# Patient Record
Sex: MALE | Race: WHITE | Employment: OTHER | URBAN - METROPOLITAN AREA
[De-identification: names, ages, dates, MRNs, and addresses within clinical notes are randomized per-mention and may not be internally consistent; named-entity substitution may affect disease eponyms.]

---

## 2017-06-15 ENCOUNTER — HOSPITAL ENCOUNTER (OUTPATIENT)
Age: 42
Setting detail: SPECIMEN
Discharge: HOME | End: 2017-06-15
Payer: MEDICARE

## 2017-06-15 LAB
ABSOLUTE EOS #: 0.2 K/UL (ref 0–0.4)
ABSOLUTE LYMPH #: 1.7 K/UL (ref 1–4.8)
ABSOLUTE MONO #: 0.5 K/UL (ref 0.1–1.2)
ALBUMIN SERPL-MCNC: 4.5 G/DL (ref 3.5–5.2)
ALBUMIN/GLOBULIN RATIO: 2.1 (ref 1–2.5)
ALP BLD-CCNC: 66 U/L (ref 40–129)
ALT SERPL-CCNC: 40 U/L (ref 5–41)
ANION GAP SERPL CALCULATED.3IONS-SCNC: 12 MMOL/L (ref 9–17)
AST SERPL-CCNC: 32 U/L
BASOPHILS # BLD: 1 %
BASOPHILS ABSOLUTE: 0.1 K/UL (ref 0–0.2)
BILIRUB SERPL-MCNC: 0.53 MG/DL (ref 0.3–1.2)
BUN BLDV-MCNC: 12 MG/DL (ref 6–20)
BUN/CREAT BLD: NORMAL (ref 9–20)
CALCIUM SERPL-MCNC: 9.5 MG/DL (ref 8.6–10.4)
CHLORIDE BLD-SCNC: 107 MMOL/L (ref 98–107)
CHOLESTEROL/HDL RATIO: 2.6
CHOLESTEROL: 177 MG/DL
CO2: 23 MMOL/L (ref 20–31)
CREAT SERPL-MCNC: 1.12 MG/DL (ref 0.7–1.2)
DIFFERENTIAL TYPE: NORMAL
EOSINOPHILS RELATIVE PERCENT: 4 %
GFR AFRICAN AMERICAN: >60 ML/MIN
GFR NON-AFRICAN AMERICAN: >60 ML/MIN
GFR SERPL CREATININE-BSD FRML MDRD: NORMAL ML/MIN/{1.73_M2}
GFR SERPL CREATININE-BSD FRML MDRD: NORMAL ML/MIN/{1.73_M2}
GLUCOSE BLD-MCNC: 98 MG/DL (ref 70–99)
HCT VFR BLD CALC: 44.9 % (ref 41–53)
HDLC SERPL-MCNC: 68 MG/DL
HEMOGLOBIN: 15.1 G/DL (ref 13.5–17.5)
LDL CHOLESTEROL: 97 MG/DL (ref 0–130)
LYMPHOCYTES # BLD: 35 %
MCH RBC QN AUTO: 29.6 PG (ref 26–34)
MCHC RBC AUTO-ENTMCNC: 33.5 G/DL (ref 31–37)
MCV RBC AUTO: 88.2 FL (ref 80–100)
MONOCYTES # BLD: 10 %
PDW BLD-RTO: 13.1 % (ref 12.5–15.4)
PLATELET # BLD: 263 K/UL (ref 140–450)
PLATELET ESTIMATE: NORMAL
PMV BLD AUTO: 8.8 FL (ref 6–12)
POTASSIUM SERPL-SCNC: 4.5 MMOL/L (ref 3.7–5.3)
RBC # BLD: 5.09 M/UL (ref 4.5–5.9)
RBC # BLD: NORMAL 10*6/UL
SEG NEUTROPHILS: 50 %
SEGMENTED NEUTROPHILS ABSOLUTE COUNT: 2.5 K/UL (ref 1.8–7.7)
SODIUM BLD-SCNC: 142 MMOL/L (ref 135–144)
TOTAL PROTEIN: 6.6 G/DL (ref 6.4–8.3)
TRIGL SERPL-MCNC: 59 MG/DL
VLDLC SERPL CALC-MCNC: NORMAL MG/DL (ref 1–30)
WBC # BLD: 4.9 K/UL (ref 3.5–11)
WBC # BLD: NORMAL 10*3/UL

## 2017-06-15 PROCEDURE — 36415 COLL VENOUS BLD VENIPUNCTURE: CPT

## 2017-06-15 PROCEDURE — 80061 LIPID PANEL: CPT

## 2017-06-15 PROCEDURE — 85025 COMPLETE CBC W/AUTO DIFF WBC: CPT

## 2017-06-15 PROCEDURE — 80053 COMPREHEN METABOLIC PANEL: CPT

## 2018-10-19 ENCOUNTER — HOSPITAL ENCOUNTER (EMERGENCY)
Age: 43
Discharge: HOME | End: 2018-10-19
Attending: EMERGENCY MEDICINE
Payer: COMMERCIAL

## 2018-10-19 ENCOUNTER — APPOINTMENT (OUTPATIENT)
Dept: CT IMAGING | Age: 43
End: 2018-10-19
Payer: COMMERCIAL

## 2018-10-19 VITALS
BODY MASS INDEX: 25.98 KG/M2 | OXYGEN SATURATION: 98 % | HEIGHT: 77 IN | SYSTOLIC BLOOD PRESSURE: 120 MMHG | HEART RATE: 50 BPM | WEIGHT: 220 LBS | DIASTOLIC BLOOD PRESSURE: 63 MMHG | RESPIRATION RATE: 14 BRPM | TEMPERATURE: 98.5 F

## 2018-10-19 DIAGNOSIS — R56.9 SEIZURE (HCC): Primary | ICD-10-CM

## 2018-10-19 LAB
ANION GAP SERPL CALCULATED.3IONS-SCNC: 10 MMOL/L (ref 9–17)
BUN BLDV-MCNC: 11 MG/DL (ref 6–20)
BUN/CREAT BLD: NORMAL (ref 9–20)
CALCIUM SERPL-MCNC: 9.3 MG/DL (ref 8.6–10.4)
CHLORIDE BLD-SCNC: 104 MMOL/L (ref 98–107)
CO2: 23 MMOL/L (ref 20–31)
CREAT SERPL-MCNC: 1.02 MG/DL (ref 0.7–1.2)
EKG ATRIAL RATE: 49 BPM
EKG P AXIS: 65 DEGREES
EKG P-R INTERVAL: 118 MS
EKG Q-T INTERVAL: 464 MS
EKG QRS DURATION: 98 MS
EKG QTC CALCULATION (BAZETT): 419 MS
EKG R AXIS: 82 DEGREES
EKG T AXIS: 65 DEGREES
EKG VENTRICULAR RATE: 49 BPM
GFR AFRICAN AMERICAN: >60 ML/MIN
GFR NON-AFRICAN AMERICAN: >60 ML/MIN
GFR SERPL CREATININE-BSD FRML MDRD: NORMAL ML/MIN/{1.73_M2}
GFR SERPL CREATININE-BSD FRML MDRD: NORMAL ML/MIN/{1.73_M2}
GLUCOSE BLD-MCNC: 99 MG/DL (ref 70–99)
KEPPRA: 40 UG/ML
MAGNESIUM: 2.2 MG/DL (ref 1.6–2.6)
POTASSIUM SERPL-SCNC: 3.9 MMOL/L (ref 3.7–5.3)
SODIUM BLD-SCNC: 137 MMOL/L (ref 135–144)

## 2018-10-19 PROCEDURE — 83735 ASSAY OF MAGNESIUM: CPT

## 2018-10-19 PROCEDURE — 99284 EMERGENCY DEPT VISIT MOD MDM: CPT

## 2018-10-19 PROCEDURE — 80048 BASIC METABOLIC PNL TOTAL CA: CPT

## 2018-10-19 PROCEDURE — 93005 ELECTROCARDIOGRAM TRACING: CPT

## 2018-10-19 PROCEDURE — 70450 CT HEAD/BRAIN W/O DYE: CPT

## 2018-10-19 PROCEDURE — 80177 DRUG SCRN QUAN LEVETIRACETAM: CPT

## 2018-10-19 ASSESSMENT — ENCOUNTER SYMPTOMS
VOMITING: 0
PHOTOPHOBIA: 1
COUGH: 0
CONSTIPATION: 0
SHORTNESS OF BREATH: 0
NAUSEA: 0
ABDOMINAL PAIN: 0

## 2018-10-19 NOTE — ED NOTES
Pt arrives to ED via EMS for c/o seizure. Pt states he has usually a seizure once a week. Pt denies loss of bowel or bladder. Pt states he has been taking his dilantin as prescribed. Pt slightly confused upon arrival. Pt states he is \"tired\". Per EMS pt told his wife to call 911 before he started seizing. Pt states he gets an aura prior to seizing. Pt states he either sees red or water. Pt placed on cardiac monitor, blood pressure cuff, pulse ox, alarms set.      Jeimy Valladares RN  10/19/18 7284

## 2018-10-19 NOTE — ED PROVIDER NOTES
Interpretation    Interpreted by me sinus bradycardia, rate 49, normal axis, no ST elevation QT corrected 419      CRITICAL CARE: There was a high probability of clinically significant/life threatening deterioration in this patient's condition which required my urgent intervention. Total critical care time was 0   minutes. This excludes any time for separately reportable procedures.        [facility],   10/19/18 8012 [facility],   10/19/18 8012 [facility],   10/19/18 9194

## 2019-06-18 ENCOUNTER — HOSPITAL ENCOUNTER (OUTPATIENT)
Age: 44
Discharge: HOME | End: 2019-06-18
Payer: MEDICARE

## 2019-06-18 LAB — KEPPRA: 25 UG/ML

## 2019-06-18 PROCEDURE — G0480 DRUG TEST DEF 1-7 CLASSES: HCPCS

## 2019-06-18 PROCEDURE — 36415 COLL VENOUS BLD VENIPUNCTURE: CPT

## 2019-06-18 PROCEDURE — 80177 DRUG SCRN QUAN LEVETIRACETAM: CPT

## 2019-06-18 PROCEDURE — 80203 DRUG SCREEN QUANT ZONISAMIDE: CPT

## 2019-06-19 LAB
MISCELLANEOUS LAB TEST RESULT: NORMAL
TEST NAME: NORMAL

## 2019-06-20 LAB — ZONISAMIDE: 30 UG/ML (ref 10–40)

## 2019-07-28 ENCOUNTER — APPOINTMENT (OUTPATIENT)
Dept: GENERAL RADIOLOGY | Age: 44
End: 2019-07-28
Payer: MEDICARE

## 2019-07-28 ENCOUNTER — HOSPITAL ENCOUNTER (EMERGENCY)
Age: 44
Discharge: HOME | End: 2019-07-28
Attending: EMERGENCY MEDICINE
Payer: MEDICARE

## 2019-07-28 VITALS
TEMPERATURE: 98.3 F | SYSTOLIC BLOOD PRESSURE: 121 MMHG | BODY MASS INDEX: 21.25 KG/M2 | RESPIRATION RATE: 18 BRPM | HEART RATE: 82 BPM | HEIGHT: 77 IN | OXYGEN SATURATION: 100 % | DIASTOLIC BLOOD PRESSURE: 73 MMHG | WEIGHT: 180 LBS

## 2019-07-28 DIAGNOSIS — S93.402A SPRAIN OF LEFT ANKLE, UNSPECIFIED LIGAMENT, INITIAL ENCOUNTER: Primary | ICD-10-CM

## 2019-07-28 PROCEDURE — 73610 X-RAY EXAM OF ANKLE: CPT

## 2019-07-28 PROCEDURE — 99283 EMERGENCY DEPT VISIT LOW MDM: CPT

## 2019-07-28 PROCEDURE — 6370000000 HC RX 637 (ALT 250 FOR IP): Performed by: STUDENT IN AN ORGANIZED HEALTH CARE EDUCATION/TRAINING PROGRAM

## 2019-07-28 RX ORDER — IBUPROFEN 800 MG/1
800 TABLET ORAL EVERY 8 HOURS PRN
Qty: 30 TABLET | Refills: 0 | Status: SHIPPED | OUTPATIENT
Start: 2019-07-28

## 2019-07-28 RX ORDER — ACETAMINOPHEN 500 MG
1000 TABLET ORAL ONCE
Status: COMPLETED | OUTPATIENT
Start: 2019-07-28 | End: 2019-07-28

## 2019-07-28 RX ORDER — ACETAMINOPHEN 325 MG/1
325 TABLET ORAL EVERY 6 HOURS PRN
Qty: 60 TABLET | Refills: 0 | Status: SHIPPED | OUTPATIENT
Start: 2019-07-28

## 2019-07-28 RX ADMIN — ACETAMINOPHEN 1000 MG: 500 TABLET ORAL at 20:46

## 2019-07-28 ASSESSMENT — ENCOUNTER SYMPTOMS
SHORTNESS OF BREATH: 0
COUGH: 0
BACK PAIN: 0
NAUSEA: 0
CONSTIPATION: 0
EYE REDNESS: 0
VOMITING: 0
DIARRHEA: 0
EYE ITCHING: 0
RHINORRHEA: 0
ABDOMINAL PAIN: 0

## 2019-07-28 ASSESSMENT — PAIN DESCRIPTION - ORIENTATION: ORIENTATION: LEFT

## 2019-07-28 ASSESSMENT — PAIN DESCRIPTION - LOCATION: LOCATION: ANKLE

## 2019-07-28 ASSESSMENT — PAIN SCALES - GENERAL: PAINLEVEL_OUTOF10: 8

## 2019-07-28 ASSESSMENT — PAIN DESCRIPTION - PAIN TYPE: TYPE: ACUTE PAIN

## 2019-07-29 NOTE — ED PROVIDER NOTES
[facility]  ED  Emergency Department Encounter  EmergencyMedicine Resident     Pt Justus Saunders  MRN: [id number]  [place] 1975  Date of evaluation: 7/28/19  PCP:  No primary care provider on file. CHIEF COMPLAINT       Chief Complaint   Patient presents with    Ankle Injury       HISTORY OF PRESENT ILLNESS  (Location/Symptom, Timing/Onset, Context/Setting, Quality, Duration, Modifying Factors, Severity.)      Andres Viera is a 38 y.o. male who presents with left ankle pain after an inversion and ankle injury. Patient was playing basketball, jumped and landed wrong way. Patient has immediate onset of swelling and pain to his left ankle. He is able to bear weight. Has a history of multiple ankle injuries bilaterally. Did take ibuprofen prior to arrival.  Has a history of epilepsy for which he takes topamax and Keppra. PAST MEDICAL / SURGICAL / SOCIAL / FAMILY HISTORY      has a past medical history of ADHD, Depression, Epilepsy ([facility] [address] Migraine, Mitral valve prolapse, and Status post VNS (vagus nerve stimulator) placement. has a past surgical history that includes Knee arthroscopy; shoulder surgery (Left); vascular surgery (Bilateral); and Tonsillectomy.     Social History     Socioeconomic History    Marital status: Single     Spouse name: Not on file    Number of children: Not on file    Years of education: Not on file    Highest education level: Not on file   Occupational History    Not on file   Social Needs    Financial resource strain: Not on file    Food insecurity:     Worry: Not on file     Inability: Not on file    Transportation needs:     Medical: Not on file     Non-medical: Not on file   Tobacco Use    Smoking status: Never Smoker   Substance and Sexual Activity    Alcohol use: Yes     Comment: occ    Drug use: Never    Sexual activity: Not on file   Lifestyle    Physical activity:     Days per week: Not on file     Minutes per session: Not on file  Stress: Not on file   Relationships    Social connections:     Talks on phone: Not on file     Gets together: Not on file     Attends Pentecostal service: Not on file     Active member of club or organization: Not on file     Attends meetings of clubs or organizations: Not on file     Relationship status: Not on file    Intimate partner violence:     Fear of current or ex partner: Not on file     Emotionally abused: Not on file     Physically abused: Not on file     Forced sexual activity: Not on file   Other Topics Concern    Not on file   Social History Narrative    Not on file       No family history on file. Allergies:  Lisinopril    Home Medications:  Prior to Admission medications    Medication Sig Start Date End Date Taking? Authorizing Provider   acetaminophen (TYLENOL) 325 MG tablet Take 1 tablet by mouth every 6 hours as needed for Pain 7/28/19  Yes Marcel Henry MD   ibuprofen (ADVIL;MOTRIN) 800 MG tablet Take 1 tablet by mouth every 8 hours as needed for Pain 7/28/19  Yes Marcel Henry MD   levETIRAcetam (KEPPRA) 750 MG tablet Take 2,000 mg by mouth 2 times daily. Yes Historical Provider, MD   citalopram (CELEXA) 20 MG tablet Take 20 mg by mouth daily. Yes Historical Provider, MD   Amphetamine-Dextroamphetamine (AMPHETAMINE SALT COMBO PO) Take 20 mg by mouth 2 times daily. Yes Historical Provider, MD   topiramate (TOPAMAX) 100 MG tablet Take 300 mg by mouth 2 times daily. Yes Historical Provider, MD   metoprolol (LOPRESSOR) 50 MG tablet Take 0.5 tablets by mouth 2 times daily. 5/9/14  Yes Ana Cristina Crane MD   Clobazam (ONFI PO) Take 10 mg by mouth 2 times daily. Historical Provider, MD       REVIEW OF SYSTEMS    (2-9 systems for level 4, 10 or more for level 5)      Review of Systems   Constitutional: Negative for chills and fever. HENT: Negative for congestion and rhinorrhea. Eyes: Negative for redness and itching. Respiratory: Negative for cough and shortness of breath. 800 MG tablet     Sig: Take 1 tablet by mouth every 8 hours as needed for Pain     Dispense:  30 tablet     Refill:  0       DIAGNOSTIC RESULTS / EMERGENCY DEPARTMENT COURSE / MDM     LABS:  No results found for this visit on 07/28/19. Lora Milton is a 38 y.o. presented with inversion ankle injury. Patient has swelling to left lateral malleolus, tenderness to palpation of the area. Patient has intact distal pulses, will receive x-ray of the left ankle. Tylenol for pain. RADIOLOGY:  XR ANKLE LEFT (MIN 3 VIEWS)   Final Result   No acute abnormality of the ankle. EKG  None    All EKG's are interpreted by the Emergency Department Physician who either signs or Co-signs this chart in the absence of a cardiologist.    EMERGENCY DEPARTMENT COURSE:  X-ray negative for fracture. Patient provided Aircast, crutches and discharged follow-up with PCP. PROCEDURES:  None    CONSULTS:  None    CRITICAL CARE:  None    FINAL IMPRESSION      1. Sprain of left ankle, unspecified ligament, initial encounter          DISPOSITION / PLAN     DISPOSITION Decision To Discharge 07/28/2019 09:22:18 PM      PATIENT REFERRED TO:  No follow-up provider specified.     DISCHARGE MEDICATIONS:  Discharge Medication List as of 7/28/2019  9:22 PM      START taking these medications    Details   acetaminophen (TYLENOL) 325 MG tablet Take 1 tablet by mouth every 6 hours as needed for Pain, Disp-60 tablet, R-0Print      ibuprofen (ADVIL;MOTRIN) 800 MG tablet Take 1 tablet by mouth every 8 hours as needed for Pain, Disp-30 tablet, R-0Print             Jose Couch MD  Emergency Medicine Resident    (Please note that portions of thisnote were completed with a voice recognition program.  Efforts were made to edit the dictations but occasionally words are mis-transcribed.)        Jose Couch MD  07/29/19 [id number]

## 2020-06-29 ENCOUNTER — HOSPITAL ENCOUNTER (EMERGENCY)
Age: 45
Discharge: HOME | End: 2020-06-29
Attending: EMERGENCY MEDICINE
Payer: COMMERCIAL

## 2020-06-29 VITALS
DIASTOLIC BLOOD PRESSURE: 84 MMHG | WEIGHT: 180 LBS | TEMPERATURE: 97.7 F | BODY MASS INDEX: 21.25 KG/M2 | HEIGHT: 77 IN | RESPIRATION RATE: 12 BRPM | HEART RATE: 85 BPM | OXYGEN SATURATION: 99 % | SYSTOLIC BLOOD PRESSURE: 123 MMHG

## 2020-06-29 PROCEDURE — 99282 EMERGENCY DEPT VISIT SF MDM: CPT

## 2020-06-29 PROCEDURE — 96372 THER/PROPH/DIAG INJ SC/IM: CPT

## 2020-06-29 PROCEDURE — 6360000002 HC RX W HCPCS: Performed by: STUDENT IN AN ORGANIZED HEALTH CARE EDUCATION/TRAINING PROGRAM

## 2020-06-29 PROCEDURE — 6370000000 HC RX 637 (ALT 250 FOR IP): Performed by: STUDENT IN AN ORGANIZED HEALTH CARE EDUCATION/TRAINING PROGRAM

## 2020-06-29 RX ORDER — DIAPER,BRIEF,INFANT-TODD,DISP
EACH MISCELLANEOUS 2 TIMES DAILY
Status: DISCONTINUED | OUTPATIENT
Start: 2020-06-29 | End: 2020-06-29 | Stop reason: HOSPADM

## 2020-06-29 RX ORDER — CETIRIZINE HYDROCHLORIDE 10 MG/1
10 TABLET ORAL DAILY
Qty: 30 TABLET | Refills: 0 | Status: SHIPPED | OUTPATIENT
Start: 2020-06-29 | End: 2020-07-29

## 2020-06-29 RX ORDER — DEXAMETHASONE SODIUM PHOSPHATE 10 MG/ML
8 INJECTION INTRAMUSCULAR; INTRAVENOUS ONCE
Status: COMPLETED | OUTPATIENT
Start: 2020-06-29 | End: 2020-06-29

## 2020-06-29 RX ORDER — CETIRIZINE HYDROCHLORIDE 10 MG/1
10 TABLET ORAL DAILY
Status: DISCONTINUED | OUTPATIENT
Start: 2020-06-29 | End: 2020-06-29 | Stop reason: HOSPADM

## 2020-06-29 RX ADMIN — CETIRIZINE HYDROCHLORIDE 10 MG: 10 TABLET ORAL at 18:52

## 2020-06-29 RX ADMIN — HYDROCORTISONE: 10 CREAM TOPICAL at 19:34

## 2020-06-29 RX ADMIN — DEXAMETHASONE SODIUM PHOSPHATE 8 MG: 10 INJECTION INTRAMUSCULAR; INTRAVENOUS at 18:52

## 2020-06-29 ASSESSMENT — ENCOUNTER SYMPTOMS
SHORTNESS OF BREATH: 0
COUGH: 0
SORE THROAT: 0
NAUSEA: 0
RHINORRHEA: 0
CONSTIPATION: 0
ABDOMINAL PAIN: 0
VOMITING: 0
ABDOMINAL DISTENTION: 0
DIARRHEA: 0
BACK PAIN: 0
TROUBLE SWALLOWING: 0
CHEST TIGHTNESS: 0
WHEEZING: 0

## 2020-06-29 NOTE — ED PROVIDER NOTES
[facility] ED     Emergency Department     Faculty Attestation    I performed a history and physical examination of the patient and discussed management with the resident. I reviewed the residents note and agree with the documented findings and plan of care. Any areas of disagreement are noted on the chart. I was personally present for the key portions of any procedures. I have documented in the chart those procedures where I was not present during the key portions. I have reviewed the emergency nurses triage note. I agree with the chief complaint, past medical history, past surgical history, allergies, medications, social and family history as documented unless otherwise noted below. For Physician Assistant/ Nurse Practitioner cases/documentation I have personally evaluated this patient and have completed at least one if not all key elements of the E/M (history, physical exam, and MDM). Additional findings are as noted. Patient with contact dermatitis to his arms, legs, and trunk. He says he has been doing yard work. He has no other complaints. On exam, he is sitting on the bed and appears well and is not in any respiratory distress. There are no oral lesions.   Will treat with Decadron, Zyrtec, and hydrocortisone cream.      Ulises Epps MD  Attending Emergency  Physician              Elio Zuniga MD  06/29/20 0656

## 2020-06-29 NOTE — ED PROVIDER NOTES
[facility]  ED  Emergency Department Encounter  Emergency Medicine Resident     Pt Name: Edson Thakur  MRN: [id number]  [place] 1975  Date of evaluation: 6/29/20  PCP:  Jase Gutierrez MD    CHIEF COMPLAINT       Chief Complaint   Patient presents with    Rash     pt states last week he was trimming his bushes in his backyard and came in contact with poison ivy and has gotten worse, spread from legs to torso and arms c/o itching       HISTORY OFPRESENT ILLNESS  (Location/Symptom, Timing/Onset, Context/Setting, Quality, Duration, Modifying Raynette Felty.)      Edson Thakur is a 41year old male who presents with a rash located on the left side of his arm as well as chest and abdomen which is been occurring for approximately 1 weeks duration. Patient states that he was out working in the yard, line after cutting grass he noted some superficial itchiness and erythema bilateral lower extremities. Patient has a since then rash in the lower legs is gone away but he is developed a rash on the left upper arm, chest, abdomen. Patient denies fever, chills, has no sloughing of the mucous membranes, has had no nausea or vomiting, change in bowel or bladder function. Patient does state that rash is made worse after going into his pool. Patient notably has a complex seizure history for which she has a vagus nerve stimulator placed, was recently [email or web address], on top of this is also taking Keppra. Patient does have follow-up appointment with [facility] clinic on 7/8/2020. PAST MEDICAL / SURGICAL / SOCIAL / FAMILY HISTORY      has a past medical history of ADHD, Depression, Epilepsy ([address] Migraine, Mitral valve prolapse, and Status post VNS (vagus nerve stimulator) placement. has a past surgical history that includes Knee arthroscopy; shoulder surgery (Left); vascular surgery (Bilateral); and Tonsillectomy.      Social History Socioeconomic History    Marital status: Single     Spouse name: Not on file    Number of children: Not on file    Years of education: Not on file    Highest education level: Not on file   Occupational History    Not on file   Social Needs    Financial resource strain: Not on file    Food insecurity     Worry: Not on file     Inability: Not on file    Transportation needs     Medical: Not on file     Non-medical: Not on file   Tobacco Use    Smoking status: Never Smoker    Smokeless tobacco: Never Used   Substance and Sexual Activity    Alcohol use: Yes     Comment: occ    Drug use: Never    Sexual activity: Not on file   Lifestyle    Physical activity     Days per week: Not on file     Minutes per session: Not on file    Stress: Not on file   Relationships    Social connections     Talks on phone: Not on file     Gets together: Not on file     Attends Buddhist service: Not on file     Active member of club or organization: Not on file     Attends meetings of clubs or organizations: Not on file     Relationship status: Not on file    Intimate partner violence     Fear of current or ex partner: Not on file     Emotionally abused: Not on file     Physically abused: Not on file     Forced sexual activity: Not on file   Other Topics Concern    Not on file   Social History Narrative    Not on file       History reviewed. No pertinent family history. Allergies:  Lisinopril    Home Medications:  Prior to Admission medications    Medication Sig Start Date End Date Taking?  Authorizing Provider   cetirizine (ZYRTEC) 10 MG tablet Take 1 tablet by mouth daily 6/29/20 7/29/20 Yes Makayla Bhatt MD   acetaminophen (TYLENOL) 325 MG tablet Take 1 tablet by mouth every 6 hours as needed for Pain 7/28/19   Simone Rangel MD   ibuprofen (ADVIL;MOTRIN) 800 MG tablet Take 1 tablet by mouth every 8 hours as needed for Pain 7/28/19   Simone Rangel MD   levETIRAcetam (KEPPRA) 750 MG tablet Take 2,000 mg General: No scleral icterus. Right eye: No discharge. Left eye: No discharge. Conjunctiva/sclera: Conjunctivae normal.      Pupils: Pupils are equal, round, and reactive to light. Neck:      Musculoskeletal: Normal range of motion. Vascular: No JVD. Trachea: No tracheal deviation. Cardiovascular:      Rate and Rhythm: Regular rhythm. Heart sounds: Normal heart sounds. Pulmonary:      Effort: Pulmonary effort is normal. No respiratory distress. Breath sounds: Normal breath sounds. No stridor. No wheezing. Abdominal:      General: Bowel sounds are normal. There is no distension. Palpations: Abdomen is soft. Tenderness: There is no abdominal tenderness. There is no guarding or rebound. Musculoskeletal: Normal range of motion. General: No tenderness or deformity. Skin:     General: Skin is warm. Coloration: Skin is not pale. Findings: Rash (No movements noted, anterior torso, anterior abdomen, non-petechial, nonblanching, non-bullous) present. Neurological:      Mental Status: He is alert and oriented to person, place, and time. Cranial Nerves: No cranial nerve deficit. DIFFERENTIAL  DIAGNOSIS     PLAN (LABS / IMAGING / EKG):  No orders of the defined types were placed in this encounter. MEDICATIONS ORDERED:  Orders Placed This Encounter   Medications    cetirizine (ZYRTEC) tablet 10 mg    hydrocortisone 1 % cream    dexamethasone (DECADRON) injection 8 mg    cetirizine (ZYRTEC) 10 MG tablet     Sig: Take 1 tablet by mouth daily     Dispense:  30 tablet     Refill:  0       DDX: Environmental irritation, allergic reaction, poison ivy, poison oak    Initial MDM/Plan: 41 y.o. male who presents with rash of the left arm, chest, belly, patient has not had associated vomiting or nausea abdominal pain, exposures a week ago with no concerns for anaphylaxis at this time.   Plan to treat patient with a Decadron injection,

## 2020-11-06 ENCOUNTER — HOSPITAL ENCOUNTER (OUTPATIENT)
Age: 45
Discharge: HOME | End: 2020-11-06
Payer: COMMERCIAL

## 2020-11-06 LAB — KEPPRA: 21 UG/ML

## 2020-11-06 PROCEDURE — 80203 DRUG SCREEN QUANT ZONISAMIDE: CPT

## 2020-11-06 PROCEDURE — 80177 DRUG SCRN QUAN LEVETIRACETAM: CPT

## 2020-11-06 PROCEDURE — 36415 COLL VENOUS BLD VENIPUNCTURE: CPT

## 2020-11-06 PROCEDURE — G0480 DRUG TEST DEF 1-7 CLASSES: HCPCS

## 2020-11-09 LAB — ZONISAMIDE: 23 UG/ML (ref 10–40)

## 2020-11-10 LAB
MISCELLANEOUS LAB TEST RESULT: NORMAL
TEST NAME: NORMAL

## 2020-12-23 ENCOUNTER — HOSPITAL ENCOUNTER (OUTPATIENT)
Age: 45
Discharge: HOME | End: 2020-12-23
Payer: COMMERCIAL

## 2020-12-23 LAB
ABSOLUTE EOS #: 0.11 K/UL (ref 0–0.44)
ABSOLUTE IMMATURE GRANULOCYTE: <0.03 K/UL (ref 0–0.3)
ABSOLUTE LYMPH #: 1.75 K/UL (ref 1.1–3.7)
ABSOLUTE MONO #: 0.48 K/UL (ref 0.1–1.2)
ALBUMIN SERPL-MCNC: 4.4 G/DL (ref 3.5–5.2)
ALBUMIN/GLOBULIN RATIO: 2.4 (ref 1–2.5)
ALP BLD-CCNC: 78 U/L (ref 40–129)
ALT SERPL-CCNC: 18 U/L (ref 5–41)
ANION GAP SERPL CALCULATED.3IONS-SCNC: 11 MMOL/L (ref 9–17)
AST SERPL-CCNC: 22 U/L
BASOPHILS # BLD: 1 % (ref 0–2)
BASOPHILS ABSOLUTE: 0.06 K/UL (ref 0–0.2)
BILIRUB SERPL-MCNC: 0.21 MG/DL (ref 0.3–1.2)
BUN BLDV-MCNC: 12 MG/DL (ref 6–20)
BUN/CREAT BLD: ABNORMAL (ref 9–20)
CALCIUM SERPL-MCNC: 9.6 MG/DL (ref 8.6–10.4)
CHLORIDE BLD-SCNC: 106 MMOL/L (ref 98–107)
CO2: 23 MMOL/L (ref 20–31)
CREAT SERPL-MCNC: 0.89 MG/DL (ref 0.7–1.2)
DIFFERENTIAL TYPE: NORMAL
EOSINOPHILS RELATIVE PERCENT: 2 % (ref 1–4)
GFR AFRICAN AMERICAN: >60 ML/MIN
GFR NON-AFRICAN AMERICAN: >60 ML/MIN
GFR SERPL CREATININE-BSD FRML MDRD: ABNORMAL ML/MIN/{1.73_M2}
GFR SERPL CREATININE-BSD FRML MDRD: ABNORMAL ML/MIN/{1.73_M2}
GLUCOSE BLD-MCNC: 99 MG/DL (ref 70–99)
HCT VFR BLD CALC: 43.9 % (ref 40.7–50.3)
HEMOGLOBIN: 14 G/DL (ref 13–17)
IMMATURE GRANULOCYTES: 0 %
LYMPHOCYTES # BLD: 39 % (ref 24–43)
MCH RBC QN AUTO: 29.7 PG (ref 25.2–33.5)
MCHC RBC AUTO-ENTMCNC: 31.9 G/DL (ref 28.4–34.8)
MCV RBC AUTO: 93 FL (ref 82.6–102.9)
MONOCYTES # BLD: 11 % (ref 3–12)
NRBC AUTOMATED: 0 PER 100 WBC
PDW BLD-RTO: 12.1 % (ref 11.8–14.4)
PLATELET # BLD: 240 K/UL (ref 138–453)
PLATELET ESTIMATE: NORMAL
PMV BLD AUTO: 10.5 FL (ref 8.1–13.5)
POTASSIUM SERPL-SCNC: 4.3 MMOL/L (ref 3.7–5.3)
RBC # BLD: 4.72 M/UL (ref 4.21–5.77)
RBC # BLD: NORMAL 10*6/UL
SEG NEUTROPHILS: 47 % (ref 36–65)
SEGMENTED NEUTROPHILS ABSOLUTE COUNT: 2.1 K/UL (ref 1.5–8.1)
SODIUM BLD-SCNC: 140 MMOL/L (ref 135–144)
TOTAL PROTEIN: 6.2 G/DL (ref 6.4–8.3)
WBC # BLD: 4.5 K/UL (ref 3.5–11.3)
WBC # BLD: NORMAL 10*3/UL

## 2020-12-23 PROCEDURE — 36415 COLL VENOUS BLD VENIPUNCTURE: CPT

## 2020-12-23 PROCEDURE — 85025 COMPLETE CBC W/AUTO DIFF WBC: CPT

## 2020-12-23 PROCEDURE — 80053 COMPREHEN METABOLIC PANEL: CPT

## 2021-06-10 ENCOUNTER — HOSPITAL ENCOUNTER (EMERGENCY)
Age: 46
Discharge: HOME | End: 2021-06-10
Attending: EMERGENCY MEDICINE
Payer: COMMERCIAL

## 2021-06-10 VITALS
OXYGEN SATURATION: 100 % | TEMPERATURE: 98.8 F | HEART RATE: 72 BPM | SYSTOLIC BLOOD PRESSURE: 128 MMHG | RESPIRATION RATE: 16 BRPM | BODY MASS INDEX: 21.94 KG/M2 | DIASTOLIC BLOOD PRESSURE: 78 MMHG | WEIGHT: 185 LBS

## 2021-06-10 DIAGNOSIS — L23.7 POISON IVY DERMATITIS: Primary | ICD-10-CM

## 2021-06-10 PROCEDURE — 96372 THER/PROPH/DIAG INJ SC/IM: CPT

## 2021-06-10 PROCEDURE — 99283 EMERGENCY DEPT VISIT LOW MDM: CPT

## 2021-06-10 PROCEDURE — 6360000002 HC RX W HCPCS: Performed by: EMERGENCY MEDICINE

## 2021-06-10 RX ORDER — METHYLPREDNISOLONE SODIUM SUCCINATE 125 MG/2ML
125 INJECTION, POWDER, LYOPHILIZED, FOR SOLUTION INTRAMUSCULAR; INTRAVENOUS ONCE
Status: COMPLETED | OUTPATIENT
Start: 2021-06-10 | End: 2021-06-10

## 2021-06-10 RX ORDER — PREDNISONE 10 MG/1
TABLET ORAL
Qty: 30 TABLET | Refills: 0 | Status: SHIPPED | OUTPATIENT
Start: 2021-06-10 | End: 2022-01-27

## 2021-06-10 RX ADMIN — METHYLPREDNISOLONE SODIUM SUCCINATE 125 MG: 125 INJECTION, POWDER, FOR SOLUTION INTRAMUSCULAR; INTRAVENOUS at 18:50

## 2021-06-10 ASSESSMENT — ENCOUNTER SYMPTOMS
COLOR CHANGE: 0
EYE DISCHARGE: 0
FACIAL SWELLING: 0
COUGH: 0
DIARRHEA: 0
VOMITING: 0
SHORTNESS OF BREATH: 0
EYE REDNESS: 0
ABDOMINAL PAIN: 0
CONSTIPATION: 0

## 2021-06-10 ASSESSMENT — PAIN DESCRIPTION - FREQUENCY: FREQUENCY: CONTINUOUS

## 2021-06-10 ASSESSMENT — PAIN SCALES - GENERAL: PAINLEVEL_OUTOF10: 8

## 2021-06-10 ASSESSMENT — PAIN DESCRIPTION - LOCATION: LOCATION: GENERALIZED

## 2021-06-10 ASSESSMENT — PAIN DESCRIPTION - DESCRIPTORS: DESCRIPTORS: ITCHING

## 2021-06-10 NOTE — ED PROVIDER NOTES
[address] [place] [address] ED  EMERGENCY DEPARTMENT ENCOUNTER      Pt Name: Rosa Isela Cordova  MRN: [id number]  [place] 1975  Date of evaluation: 6/10/2021  Provider: Fam Mayo MD    [address] [place] [address]       Chief Complaint   Patient presents with    Rash    Poison Ivy         HISTORY OF PRESENT ILLNESS  (Location/Symptom, Timing/Onset, Context/Setting, Quality, Duration, Modifying Factors, Severity.)   Rosa Isela Cordova is a 40 y.o. male who presents to the emergency department for rash which she states is poison ivy. He has had it for 2 days and it started after weed whacking. Its mostly on his torso and arms. No difficulty breathing or swallowing. The rash is continuous. No fever. Nursing Notes were reviewed. ALLERGIES     Lisinopril    CURRENT MEDICATIONS       Previous Medications    ACETAMINOPHEN (TYLENOL) 325 MG TABLET    Take 1 tablet by mouth every 6 hours as needed for Pain    AMPHETAMINE-DEXTROAMPHETAMINE (AMPHETAMINE SALT COMBO PO)    Take 20 mg by mouth 2 times daily. CITALOPRAM (CELEXA) 20 MG TABLET    Take 20 mg by mouth daily. CLOBAZAM (ONFI PO)    Take 10 mg by mouth 2 times daily. IBUPROFEN (ADVIL;MOTRIN) 800 MG TABLET    Take 1 tablet by mouth every 8 hours as needed for Pain    LEVETIRACETAM (KEPPRA) 750 MG TABLET    Take 2,000 mg by mouth 2 times daily. METOPROLOL (LOPRESSOR) 50 MG TABLET    Take 0.5 tablets by mouth 2 times daily. TOPIRAMATE (TOPAMAX) 100 MG TABLET    Take 300 mg by mouth 2 times daily. PAST MEDICAL HISTORY         Diagnosis Date    ADHD     Depression     Epilepsy ([facility] [address]     Migraine     Mitral valve prolapse     Status post VNS (vagus nerve stimulator) placement        SURGICAL HISTORY           Procedure Laterality Date    KNEE ARTHROSCOPY      SHOULDER SURGERY Left     TONSILLECTOMY      VASCULAR SURGERY Bilateral     Ligation and strip         FAMILY HISTORY     History reviewed. No pertinent family history.   No family status information on file. SOCIAL HISTORY      reports that he has never smoked. He has never used smokeless tobacco. He reports current alcohol use. He reports that he does not use drugs. REVIEW OF SYSTEMS    (2-9 systems for level 4, 10 or more for level 5)     Review of Systems   Constitutional: Negative for chills, fatigue and fever. HENT: Negative for congestion, ear discharge and facial swelling. Eyes: Negative for discharge and redness. Respiratory: Negative for cough and shortness of breath. Cardiovascular: Negative for chest pain. Gastrointestinal: Negative for abdominal pain, constipation, diarrhea and vomiting. Genitourinary: Negative for dysuria and hematuria. Musculoskeletal: Negative for arthralgias. Skin: Positive for rash. Negative for color change. Neurological: Negative for syncope, numbness and headaches. Hematological: Negative for adenopathy. Psychiatric/Behavioral: Negative for confusion. The patient is not nervous/anxious. Except as noted above the remainder of the review of systems was reviewed and negative. PHYSICAL EXAM    (up to 7 for level 4, 8 or more for level 5)     Vitals:    06/10/21 1811   BP: 128/78   Pulse: 72   Resp: 16   Temp: 98.8 °F (37.1 °C)   TempSrc: Oral   SpO2: 100%   Weight: 185 lb (83.9 kg)       Physical Exam  Vitals reviewed. Constitutional:       General: He is not in acute distress. Appearance: He is well-developed. He is not diaphoretic. HENT:      Head: Normocephalic and atraumatic. Eyes:      General: No scleral icterus. Right eye: No discharge. Left eye: No discharge. Cardiovascular:      Rate and Rhythm: Normal rate and regular rhythm. Pulmonary:      Effort: Pulmonary effort is normal. No respiratory distress. Breath sounds: Normal breath sounds. No stridor. No wheezing or rales. Abdominal:      General: There is no distension. Palpations: Abdomen is soft. Tenderness:  There is no abdominal tenderness. Musculoskeletal:         General: Normal range of motion. Cervical back: Neck supple. Lymphadenopathy:      Cervical: No cervical adenopathy. Skin:     General: Skin is warm and dry. Findings: Rash present. No erythema. Comments: She has an erythematous raised rash which is linear in some areas present mostly on the torso and arms. Neurological:      Mental Status: He is alert and oriented to person, place, and time. Psychiatric:         Behavior: Behavior normal.             DIAGNOSTIC RESULTS     EKG: All EKG's are interpreted by the Emergency Department Physician who either signs or Co-signs this chart in the absence of a cardiologist.    Not indicated    RADIOLOGY:   Non-plain film images such as CT, Ultrasound and MRI are read by the radiologist. Plain radiographic images are visualized and preliminarily interpreted by the emergency physician with the below findings:    Not indicated    Interpretation per the Radiologist below, if available at the time of this note:        LABS:  Labs Reviewed - No data to display    All other labs were within normal range or not returned as of this dictation. EMERGENCY DEPARTMENT COURSE and DIFFERENTIAL DIAGNOSIS/MDM:   Vitals:    Vitals:    06/10/21 1811   BP: 128/78   Pulse: 72   Resp: 16   Temp: 98.8 °F (37.1 °C)   TempSrc: Oral   SpO2: 100%   Weight: 185 lb (83.9 kg)       Orders Placed This Encounter   Medications    methylPREDNISolone sodium (SOLU-MEDROL) injection 125 mg    predniSONE (DELTASONE) 10 MG tablet     Sig: Take 4 by mouth daily for 3 days then  3 by mouth daily for 3 days then  2 by mouth daily for 3 days then  1 by mouth daily for 3 days     Dispense:  30 tablet     Refill:  0       Medical Decision Making: He is given IM Solu-Medrol and prescribed prednisone. Treatment diagnosis and follow-up were discussed with the patient      CONSULTS:  None    PROCEDURES:  None    FINAL IMPRESSION      1.  Poison ivy dermatitis          DISPOSITION/PLAN   DISPOSITION Decision To Discharge 06/10/2021 06:44:35 PM      PATIENT REFERRED TO:   Brennan Closs, MD  [phone]  [place] [address]       As needed    [facility] ED  [address] [phone]    If symptoms worsen      DISCHARGE MEDICATIONS:     New Prescriptions    PREDNISONE (DELTASONE) 10 MG TABLET    Take 4 by mouth daily for 3 days then  3 by mouth daily for 3 days then  2 by mouth daily for 3 days then  1 by mouth daily for 3 days         (Please note that portions of this note were completed with a voice recognition program.  Efforts were made to edit the dictations but occasionally words are mis-transcribed.)    Ronen Conway MD  Attending Emergency Physician           Ronen Conway MD  06/10/21 [id number]

## 2021-09-22 ENCOUNTER — HOSPITAL ENCOUNTER (OUTPATIENT)
Age: 46
Discharge: HOME | End: 2021-09-22
Payer: COMMERCIAL

## 2021-09-22 LAB — KEPPRA: 26 UG/ML

## 2021-09-22 PROCEDURE — G0480 DRUG TEST DEF 1-7 CLASSES: HCPCS

## 2021-09-22 PROCEDURE — 36415 COLL VENOUS BLD VENIPUNCTURE: CPT

## 2021-09-22 PROCEDURE — 80203 DRUG SCREEN QUANT ZONISAMIDE: CPT

## 2021-09-22 PROCEDURE — 80177 DRUG SCRN QUAN LEVETIRACETAM: CPT

## 2021-09-25 LAB — ZONISAMIDE: 17 UG/ML (ref 10–40)

## 2021-09-26 LAB
MISCELLANEOUS LAB TEST RESULT: ABNORMAL
TEST NAME: ABNORMAL

## 2022-01-27 ENCOUNTER — OFFICE VISIT (OUTPATIENT)
Dept: PRIMARY CARE CLINIC | Age: 47
End: 2022-01-27
Payer: COMMERCIAL

## 2022-01-27 VITALS
TEMPERATURE: 97.9 F | OXYGEN SATURATION: 99 % | WEIGHT: 180 LBS | HEIGHT: 77 IN | HEART RATE: 84 BPM | BODY MASS INDEX: 21.25 KG/M2 | DIASTOLIC BLOOD PRESSURE: 77 MMHG | SYSTOLIC BLOOD PRESSURE: 107 MMHG

## 2022-01-27 DIAGNOSIS — Z23 NEED FOR IMMUNIZATION AGAINST INFLUENZA: ICD-10-CM

## 2022-01-27 DIAGNOSIS — I34.1 MITRAL VALVE PROLAPSE: ICD-10-CM

## 2022-01-27 DIAGNOSIS — G40.409 TONIC CLONIC SEIZURES (HCC): ICD-10-CM

## 2022-01-27 DIAGNOSIS — Z00.00 ENCOUNTER FOR MEDICAL EXAMINATION TO ESTABLISH CARE: Primary | ICD-10-CM

## 2022-01-27 DIAGNOSIS — M17.11 PRIMARY OSTEOARTHRITIS OF RIGHT KNEE: ICD-10-CM

## 2022-01-27 DIAGNOSIS — I10 PRIMARY HYPERTENSION: ICD-10-CM

## 2022-01-27 DIAGNOSIS — Q87.40 MARFAN SYNDROME: ICD-10-CM

## 2022-01-27 PROCEDURE — G8420 CALC BMI NORM PARAMETERS: HCPCS | Performed by: STUDENT IN AN ORGANIZED HEALTH CARE EDUCATION/TRAINING PROGRAM

## 2022-01-27 PROCEDURE — G0008 ADMIN INFLUENZA VIRUS VAC: HCPCS | Performed by: STUDENT IN AN ORGANIZED HEALTH CARE EDUCATION/TRAINING PROGRAM

## 2022-01-27 PROCEDURE — 99204 OFFICE O/P NEW MOD 45 MIN: CPT | Performed by: STUDENT IN AN ORGANIZED HEALTH CARE EDUCATION/TRAINING PROGRAM

## 2022-01-27 PROCEDURE — 90674 CCIIV4 VAC NO PRSV 0.5 ML IM: CPT | Performed by: STUDENT IN AN ORGANIZED HEALTH CARE EDUCATION/TRAINING PROGRAM

## 2022-01-27 PROCEDURE — 1036F TOBACCO NON-USER: CPT | Performed by: STUDENT IN AN ORGANIZED HEALTH CARE EDUCATION/TRAINING PROGRAM

## 2022-01-27 PROCEDURE — G8482 FLU IMMUNIZE ORDER/ADMIN: HCPCS | Performed by: STUDENT IN AN ORGANIZED HEALTH CARE EDUCATION/TRAINING PROGRAM

## 2022-01-27 PROCEDURE — G8427 DOCREV CUR MEDS BY ELIG CLIN: HCPCS | Performed by: STUDENT IN AN ORGANIZED HEALTH CARE EDUCATION/TRAINING PROGRAM

## 2022-01-27 RX ORDER — LOSARTAN POTASSIUM 25 MG/1
TABLET ORAL
COMMUNITY
Start: 2022-01-19

## 2022-01-27 RX ORDER — CARVEDILOL 3.12 MG/1
TABLET ORAL
COMMUNITY
Start: 2022-01-07

## 2022-01-27 RX ORDER — ZONISAMIDE 100 MG/1
CAPSULE ORAL
COMMUNITY
Start: 2022-01-07

## 2022-01-27 RX ORDER — DIVALPROEX SODIUM 250 MG/1
250 TABLET, EXTENDED RELEASE ORAL 2 TIMES DAILY
COMMUNITY
Start: 2022-01-03 | End: 2022-08-12

## 2022-01-27 RX ORDER — RIZATRIPTAN BENZOATE 10 MG/1
TABLET ORAL
COMMUNITY
Start: 2022-01-07

## 2022-01-27 SDOH — ECONOMIC STABILITY: FOOD INSECURITY: WITHIN THE PAST 12 MONTHS, THE FOOD YOU BOUGHT JUST DIDN'T LAST AND YOU DIDN'T HAVE MONEY TO GET MORE.: NEVER TRUE

## 2022-01-27 SDOH — ECONOMIC STABILITY: FOOD INSECURITY: WITHIN THE PAST 12 MONTHS, YOU WORRIED THAT YOUR FOOD WOULD RUN OUT BEFORE YOU GOT MONEY TO BUY MORE.: NEVER TRUE

## 2022-01-27 ASSESSMENT — PATIENT HEALTH QUESTIONNAIRE - PHQ9
SUM OF ALL RESPONSES TO PHQ QUESTIONS 1-9: 0
SUM OF ALL RESPONSES TO PHQ QUESTIONS 1-9: 0
1. LITTLE INTEREST OR PLEASURE IN DOING THINGS: 0
2. FEELING DOWN, DEPRESSED OR HOPELESS: 0
SUM OF ALL RESPONSES TO PHQ QUESTIONS 1-9: 0
SUM OF ALL RESPONSES TO PHQ QUESTIONS 1-9: 0
SUM OF ALL RESPONSES TO PHQ9 QUESTIONS 1 & 2: 0

## 2022-01-27 ASSESSMENT — SOCIAL DETERMINANTS OF HEALTH (SDOH): HOW HARD IS IT FOR YOU TO PAY FOR THE VERY BASICS LIKE FOOD, HOUSING, MEDICAL CARE, AND HEATING?: NOT HARD AT ALL

## 2022-01-27 NOTE — PROGRESS NOTES
All very anxious visit Information    Have you changed or started any medications since your last visit including any over-the-counter medicines, vitamins, or herbal medicines? no   Are you having any side effects from any of your medications? -  no  Have you stopped taking any of your medications? Is so, why? -  no    Have you seen any other physician or provider since your last visit? No  Have you had any other diagnostic tests since your last visit? No  Have you been seen in the emergency room and/or had an admission to a hospital since we last saw you? No  Have you had your routine dental cleaning in the past 6 months? no    Have you activated your [employer] account? If not, what are your barriers?  Yes     Patient Care Team:  Crystal Feliciano MD as PCP - General (Internal Medicine)    Medical History Review  Past Medical, Family, and Social History reviewed and does contribute to the patient presenting condition    Health Maintenance   Topic Date Due    Hepatitis C screen  Never done    COVID-19 Vaccine (1) Never done    Depression Screen  Never done    HIV screen  Never done    Colon cancer screen colonoscopy  Never done    Flu vaccine (1) 2021    Potassium monitoring  2021    Creatinine monitoring  2021    Lipid screen  06/15/2022    DTaP/Tdap/Td vaccine (2 - Td or Tdap) 2024    Hepatitis A vaccine  Aged Out    Hepatitis B vaccine  Aged Out    Hib vaccine  Aged Out    Meningococcal (ACWY) vaccine  Aged Out    Pneumococcal 0-64 years Vaccine  Aged Out          2022    Zen Benitez (:  1975) is a 56 y.o. male, here for evaluation of the following medical concerns:    HPI: 56 M hx tonic clonic seizure    1st episode 23 yo    Used to have tremors before going to bed or when stressed out    Presents today to establish care  [facility] at [facility]  Has cardiologist here for MVP    Hx of multiple concussions playing football  Hx of multiple r knee arthroplasties  Hx of torn meniscus    Has own irrigation company  Used to work at [employer]  13-14 years ago stopped driving  Had a  and now on disability due to seizure disorder    Since 2016 has been on disability  3 kids     Last seizure - 3 days ago    Review of Systems Pertinent positives and negatives included in HPI 14 point ROS otherwise negative      Prior to Visit Medications    Medication Sig Taking? Authorizing Provider   divalproex (DEPAKOTE ER) 250 MG extended release tablet Take 250 mg by mouth 2 times daily Yes Historical Provider, MD   zonisamide (ZONEGRAN) 100 MG capsule TAKE 2 CAPSULES BY MOUTH THREE TIMES A DAY. Yes Historical Provider, MD   rizatriptan (MAXALT) 10 MG tablet  Yes Historical Provider, MD   losartan (COZAAR) 25 MG tablet TAKE 1/2 TABLET BY MOUTH DAILY Yes Historical Provider, MD   carvedilol (COREG) 3.125 MG tablet  Yes Historical Provider, MD   Multiple Vitamins-Minerals (MULTIVITAL-M PO) Take by mouth daily Yes Historical Provider, MD   acetaminophen (TYLENOL) 325 MG tablet Take 1 tablet by mouth every 6 hours as needed for Pain Yes Saskia Fulton MD   ibuprofen (ADVIL;MOTRIN) 800 MG tablet Take 1 tablet by mouth every 8 hours as needed for Pain Yes Saskia Fulton MD   levETIRAcetam (KEPPRA) 750 MG tablet Take 2,000 mg by mouth 2 times daily. Yes Historical Provider, MD   Amphetamine-Dextroamphetamine (AMPHETAMINE SALT COMBO PO) Take 20 mg by mouth 2 times daily. Yes Historical Provider, MD   Clobazam (ONFI PO) Take 10 mg by mouth 2 times daily.  Yes Historical Provider, MD        Allergies   Allergen Reactions    Lisinopril Rash    Rufinamide Nausea And Vomiting     Increased seizures, nausea, memory issues  Other reaction(s): GI Upset  Increased seizures, nausea, memory issues         Past Medical History:   Diagnosis Date    ADHD     Depression     Epile[facility] ([facility] [address]     Migraine     Mitral valve prolapse     Status post VNS (vagus nerve stimulator) placement Past Surgical History:   Procedure Laterality Date    KNEE ARTHROSCOPY      SHOULDER SURGERY Left     TONSILLECTOMY      VASCULAR SURGERY Bilateral     Ligation and strip       Social History     Socioeconomic History    Marital status: Single     Spouse name: Not on file    Number of children: Not on file    Years of education: Not on file    Highest education level: Not on file   Occupational History    Not on file   Tobacco Use    Smoking status: Never Smoker    Smokeless tobacco: Never Used   Substance and Sexual Activity    Alcohol use: Yes     Comment: occ    Drug use: Never    Sexual activity: Not on file   Other Topics Concern    Not on file   Social History Narrative    Not on file     Social Determinants of Health     Financial Resource Strain: Low Risk     Difficulty of Paying Living Expenses: Not hard at all   Food Insecurity: No Food Insecurity    Worried About Running Out of Food in the Last Year: Never true    [address] in the Last Year: Never true   Transportation Needs:     Lack of Transportation (Medical): Not on file    Lack of Transportation (Non-Medical):  Not on file   Physical Activity:     Days of Exercise per Week: Not on file    Minutes of Exercise per Session: Not on file   Stress:     Feeling of Stress : Not on file   Social Connections:     Frequency of Communication with Friends and Family: Not on file    Frequency of Social Gatherings with Friends and Family: Not on file    Attends Spiritism Services: Not on file    Active Member of Clubs or Organizations: Not on file    Attends Club or Organization Meetings: Not on file    Marital Status: Not on file   Intimate Partner Violence:     Fear of Current or Ex-Partner: Not on file    Emotionally Abused: Not on file    Physically Abused: Not on file    Sexually Abused: Not on file   Housing Stability:     Unable to Pay for Housing in the Last Year: Not on file    Number of [place] in the Last Year: Not on file    Unstable Housing in the Last Year: Not on file        No family history on file. Vitals:    01/27/22 1051   BP: 107/77   Site: Left Upper Arm   Position: Sitting   Cuff Size: Medium Adult   Pulse: 84   Temp: 97.9 °F (36.6 °C)   SpO2: 99%   Weight: 180 lb (81.6 kg)   Height: 6' 5\" (1.956 m)     Estimated body mass index is 21.34 kg/m² as calculated from the following:    Height as of this encounter: 6' 5\" (1.956 m). Weight as of this encounter: 180 lb (81.6 kg). Physical Exam  Vitals and nursing note reviewed. Constitutional:       Appearance: Normal appearance. HENT:      Head: Normocephalic and atraumatic. Eyes:      Extraocular Movements: Extraocular movements intact. Cardiovascular:      Rate and Rhythm: Normal rate and regular rhythm. Pulses: Normal pulses. Heart sounds: Normal heart sounds. Pulmonary:      Effort: Pulmonary effort is normal.      Breath sounds: Normal breath sounds. Chest:          Comments: Pectus excavatum  Abdominal:      General: Abdomen is flat. Palpations: Abdomen is soft. Musculoskeletal:         General: Normal range of motion. Cervical back: Normal range of motion and neck supple. Legs:       Comments: Creaky knee,    Skin:     General: Skin is warm. Comments: Scattered moles on back, melanocytic nevi,    Neurological:      General: No focal deficit present. Mental Status: He is alert and oriented to person, place, and time. ASSESSMENT/PLAN:  1. Encounter for medical examination to establish care    2. Need for immunization against influenza    - INFLUENZA, MDCK QUADV, 2 YRS AND OLDER, IM, PF, PREFILL SYR OR SDV, 0.5ML (FLUCELVAX QUADV, PF)    3. Tonic clonic seizures ([address]    4. Mitral valve prolapse    5. Primary hypertension    6. Marfan syndrome  7. Dilated aortic root - most recent measurement 10/2021 4.1 cm  8. Dilated cardiomyopathy  9.  Primary OA right knee    His seizure disorder and the panoply of medications he takes is a great burden in his life  He does see neurologist at [address] [place] [address] and this is his 5th eval by neurology  3/2021 was hospitalized  depakoate was restarted  Appears to be doing better wit this  Today he is also 180 lbs previous healthy weight around 240-270 range when he was lifting exercising playing football      No follow-ups on file. An  electronic signature was used to authenticate this note.     --Crystal Feliciano MD on 1/27/2022 at 11:36 AM

## 2022-02-07 ENCOUNTER — OFFICE VISIT (OUTPATIENT)
Dept: ORTHOPEDIC SURGERY | Age: 47
End: 2022-02-07
Payer: COMMERCIAL

## 2022-02-07 VITALS — BODY MASS INDEX: 21.25 KG/M2 | HEIGHT: 77 IN | WEIGHT: 180 LBS

## 2022-02-07 DIAGNOSIS — M22.41 CHONDROMALACIA, PATELLA, RIGHT: Primary | ICD-10-CM

## 2022-02-07 DIAGNOSIS — M25.561 RIGHT KNEE PAIN, UNSPECIFIED CHRONICITY: Primary | ICD-10-CM

## 2022-02-07 PROCEDURE — G8420 CALC BMI NORM PARAMETERS: HCPCS | Performed by: ORTHOPAEDIC SURGERY

## 2022-02-07 PROCEDURE — G8482 FLU IMMUNIZE ORDER/ADMIN: HCPCS | Performed by: ORTHOPAEDIC SURGERY

## 2022-02-07 PROCEDURE — 20610 DRAIN/INJ JOINT/BURSA W/O US: CPT | Performed by: ORTHOPAEDIC SURGERY

## 2022-02-07 PROCEDURE — G8427 DOCREV CUR MEDS BY ELIG CLIN: HCPCS | Performed by: ORTHOPAEDIC SURGERY

## 2022-02-07 PROCEDURE — 99204 OFFICE O/P NEW MOD 45 MIN: CPT | Performed by: ORTHOPAEDIC SURGERY

## 2022-02-07 RX ORDER — METHYLPREDNISOLONE ACETATE 80 MG/ML
80 INJECTION, SUSPENSION INTRA-ARTICULAR; INTRALESIONAL; INTRAMUSCULAR; SOFT TISSUE ONCE
Status: COMPLETED | OUTPATIENT
Start: 2022-02-07 | End: 2022-02-07

## 2022-02-07 RX ORDER — BUPIVACAINE HYDROCHLORIDE 2.5 MG/ML
2 INJECTION, SOLUTION INFILTRATION; PERINEURAL ONCE
Status: COMPLETED | OUTPATIENT
Start: 2022-02-07 | End: 2022-02-07

## 2022-02-07 RX ADMIN — METHYLPREDNISOLONE ACETATE 80 MG: 80 INJECTION, SUSPENSION INTRA-ARTICULAR; INTRALESIONAL; INTRAMUSCULAR; SOFT TISSUE at 15:47

## 2022-02-07 RX ADMIN — BUPIVACAINE HYDROCHLORIDE 5 MG: 2.5 INJECTION, SOLUTION INFILTRATION; PERINEURAL at 15:47

## 2022-02-07 ASSESSMENT — ENCOUNTER SYMPTOMS
CHEST TIGHTNESS: 0
APNEA: 0
COUGH: 0
VOMITING: 0
ABDOMINAL PAIN: 0

## 2022-02-07 NOTE — PROGRESS NOTES
Ligation and strip       Current Medications:   Current Outpatient Medications   Medication Sig Dispense Refill    divalproex (DEPAKOTE ER) 250 MG extended release tablet Take 250 mg by mouth 2 times daily      zonisamide (ZONEGRAN) 100 MG capsule TAKE 2 CAPSULES BY MOUTH THREE TIMES A DAY.  rizatriptan (MAXALT) 10 MG tablet       losartan (COZAAR) 25 MG tablet TAKE 1/2 TABLET BY MOUTH DAILY      carvedilol (COREG) 3.125 MG tablet       Multiple Vitamins-Minerals (MULTIVITAL-M PO) Take by mouth daily      acetaminophen (TYLENOL) 325 MG tablet Take 1 tablet by mouth every 6 hours as needed for Pain 60 tablet 0    ibuprofen (ADVIL;MOTRIN) 800 MG tablet Take 1 tablet by mouth every 8 hours as needed for Pain 30 tablet 0    levETIRAcetam (KEPPRA) 750 MG tablet Take 2,000 mg by mouth 2 times daily.  Amphetamine-Dextroamphetamine (AMPHETAMINE SALT COMBO PO) Take 20 mg by mouth 2 times daily.  Clobazam (ONFI PO) Take 10 mg by mouth 2 times daily. No current facility-administered medications for this visit.        Allergies:    Lisinopril and Rufinamide    Social History:   Social History     Socioeconomic History    Marital status: Single     Spouse name: Not on file    Number of children: Not on file    Years of education: Not on file    Highest education level: Not on file   Occupational History    Not on file   Tobacco Use    Smoking status: Never Smoker    Smokeless tobacco: Never Used   Substance and Sexual Activity    Alcohol use: Yes     Comment: occ    Drug use: Never    Sexual activity: Not on file   Other Topics Concern    Not on file   Social History Narrative    Not on file     Social Determinants of Health     Financial Resource Strain: Low Risk     Difficulty of Paying Living Expenses: Not hard at all   Food Insecurity: No Food Insecurity    Worried About Running Out of Food in the Last Year: Never true    [place] of Food in the Last Year: Never true   Transportation Needs:     Lack of Transportation (Medical): Not on file    Lack of Transportation (Non-Medical): Not on file   Physical Activity:     Days of Exercise per Week: Not on file    Minutes of Exercise per Session: Not on file   Stress:     Feeling of Stress : Not on file   Social Connections:     Frequency of Communication with Friends and Family: Not on file    Frequency of Social Gatherings with Friends and Family: Not on file    Attends Jewish Services: Not on file    Active Member of [address] [place] [address] or Organizations: Not on file    Attends Club or Organization Meetings: Not on file    Marital Status: Not on file   Intimate Partner Violence:     Fear of Current or Ex-Partner: Not on file    Emotionally Abused: Not on file    Physically Abused: Not on file    Sexually Abused: Not on file   Housing Stability:     Unable to Pay for Housing in the Last Year: Not on file    Number of [place] in the Last Year: Not on file    Unstable Housing in the Last Year: Not on file       Family History:  History reviewed. No pertinent family history. REVIEW OF SYSTEMS:  Review of Systems   Constitutional: Negative for chills and fever. Respiratory: Negative for apnea, cough and chest tightness. Cardiovascular: Negative for chest pain and palpitations. Gastrointestinal: Negative for abdominal pain and vomiting. Genitourinary: Negative for difficulty urinating. Musculoskeletal: Positive for arthralgias (right knee). Negative for gait problem, joint swelling and myalgias. Neurological: Negative for dizziness, weakness and numbness. I have reviewed the CC, HPI, ROS, PMH, FHX, Social History, and if not present in this note, I have reviewed in the patient's chart. I agree with the documentation provided by other staff and have reviewed their documentation prior to providing my signature indicating agreement.     PHYSICAL EXAM:  Ht 6' 5\" (1.956 m)   Wt 180 lb (81.6 kg)   BMI 21.34 kg/m²  Body mass index is 21.34 kg/m². Physical Exam  Gen: alert and oriented to person and place. Psych:  Appropriate affect; Appropriate knowledge base; Appropriate mood; No hallucinations; Head: normocephalic, atraumatic   Chest: symmetric chest excursion; nonlabored respiratory effort. Pelvis: stable; no obvious pelvis deformity  Ortho Exam  Extremity: Can walk on heels and tip toes without pain. Patient has pain with squatting. Well healed vertical incision medial aspect right knee. No limp noted with ambulation. Significant patellar crepitance right knee. Patient relates a mild shortening weightbearing phase to the Right lower extremity. Mild knee effusion is noted to the Right lower extremity. There is no erythema, warmth, skin lesions, signs of infection. Positive Patellar crepitance is noted on the. Negative J sign is noted. Negative Patellar apprehension is noted. PositiveLateral patellar compression test is noted. There is no instability with varus and valgus stress applied at 0 and 30° of flexion. There is negative anterior drawer Lachman's test.  Negative Marion's testing is appreciated. There is negative hip log roll and Stinchfield test noted. Full range of motion of the ankle is noted. Motor, sensory, vascular examination to the lower extremity is appreciated. Mild to moderate thigh atrophy on the right. Radiology:     XR KNEE RIGHT (MIN 4 VIEWS)    Result Date: 2/7/2022  History:   Right knee pain Findings:   Standing AP/Lateral/Tunnel/Merchant view xrays of the Right done in the office today shows mild medial joint space narrowing, tricompartmental osteophytosis, joint line sclerosis medially. No evidence of fracture, subluxation, dislocation, radioopaque foreign body/tumor is noted. Lateral subluxation of the tibia is appreciated. Moderate patellofemoral degenerative changes are appreciated Impression:   Right knee mild  degenerative changes as described above.     KNEE INJECTION Milena Leyva DO, [facility]  2/8/2022 8:02 AM    This note is created with the assistance of a speech recognition program.  While intending to generate a document that actually reflects the content of the visit, the document can still have some errors including those of syntax and sound a like substitutions which may escape proof reading.  In such instances, actual meaning can be extrapolated by contextual diversion      Electronically signed by James Beltrán on 2/8/2022 at 8:01 AM

## 2022-06-15 ENCOUNTER — OFFICE VISIT (OUTPATIENT)
Dept: PRIMARY CARE CLINIC | Age: 47
End: 2022-06-15
Payer: COMMERCIAL

## 2022-06-15 VITALS
SYSTOLIC BLOOD PRESSURE: 127 MMHG | DIASTOLIC BLOOD PRESSURE: 79 MMHG | HEART RATE: 83 BPM | TEMPERATURE: 99.5 F | OXYGEN SATURATION: 97 % | BODY MASS INDEX: 21.46 KG/M2 | WEIGHT: 181 LBS

## 2022-06-15 DIAGNOSIS — M41.9 SCOLIOSIS OF LUMBOSACRAL SPINE, UNSPECIFIED SCOLIOSIS TYPE: ICD-10-CM

## 2022-06-15 DIAGNOSIS — G89.29 CHRONIC RIGHT-SIDED LOW BACK PAIN WITH RIGHT-SIDED SCIATICA: ICD-10-CM

## 2022-06-15 DIAGNOSIS — Z13.220 LIPID SCREENING: ICD-10-CM

## 2022-06-15 DIAGNOSIS — Z11.4 ENCOUNTER FOR SCREENING FOR HIV: ICD-10-CM

## 2022-06-15 DIAGNOSIS — Z11.59 NEED FOR HEPATITIS C SCREENING TEST: ICD-10-CM

## 2022-06-15 DIAGNOSIS — L23.7 POISON IVY: Primary | ICD-10-CM

## 2022-06-15 DIAGNOSIS — Z12.11 COLON CANCER SCREENING: ICD-10-CM

## 2022-06-15 DIAGNOSIS — M54.41 CHRONIC RIGHT-SIDED LOW BACK PAIN WITH RIGHT-SIDED SCIATICA: ICD-10-CM

## 2022-06-15 PROCEDURE — 99214 OFFICE O/P EST MOD 30 MIN: CPT | Performed by: NURSE PRACTITIONER

## 2022-06-15 PROCEDURE — 1036F TOBACCO NON-USER: CPT | Performed by: NURSE PRACTITIONER

## 2022-06-15 PROCEDURE — G8420 CALC BMI NORM PARAMETERS: HCPCS | Performed by: NURSE PRACTITIONER

## 2022-06-15 PROCEDURE — G8427 DOCREV CUR MEDS BY ELIG CLIN: HCPCS | Performed by: NURSE PRACTITIONER

## 2022-06-15 RX ORDER — CYCLOBENZAPRINE HCL 5 MG
5 TABLET ORAL NIGHTLY
Qty: 30 TABLET | Refills: 0 | Status: SHIPPED | OUTPATIENT
Start: 2022-06-15 | End: 2022-07-15

## 2022-06-15 RX ORDER — CLOBETASOL PROPIONATE 0.5 MG/G
OINTMENT TOPICAL
Qty: 30 G | Refills: 0 | Status: SHIPPED | OUTPATIENT
Start: 2022-06-15

## 2022-06-15 ASSESSMENT — ENCOUNTER SYMPTOMS
WHEEZING: 0
BOWEL INCONTINENCE: 0
SHORTNESS OF BREATH: 0
ABDOMINAL PAIN: 0
BLOOD IN STOOL: 0
TROUBLE SWALLOWING: 0
DIARRHEA: 0
VOMITING: 0
BACK PAIN: 1

## 2022-06-15 ASSESSMENT — PATIENT HEALTH QUESTIONNAIRE - PHQ9
SUM OF ALL RESPONSES TO PHQ9 QUESTIONS 1 & 2: 0
2. FEELING DOWN, DEPRESSED OR HOPELESS: 0
SUM OF ALL RESPONSES TO PHQ QUESTIONS 1-9: 0
1. LITTLE INTEREST OR PLEASURE IN DOING THINGS: 0
SUM OF ALL RESPONSES TO PHQ QUESTIONS 1-9: 0

## 2022-06-15 NOTE — PROGRESS NOTES
[facility]  [id number] [facility] [address]  Dept: [phone]  Dept Fax: [phone]    Patient Care Team:  SHAINA Agrawal - CNP as PCP - General (Family Medicine)  Henri Shetty MD as PCP - [facility] Empaneled Provider    6/15/2022    Lazaro Johnson (:  1975) radha 56 y.o. male, here for evaluation of the following medical concerns:   Chief Complaint   Patient presents with   Shay Mistry Doctor     states just had a seizure before coming in   [address] [place] [address] forearm         Exposed to poison ivy about 10 days ago. Has been mainly to right forearm, slightly to left forearm. Does have a spot on his abdomen as well. Has previous severe reactions requiring steroid shots. Not as severe this time    C/O chronic right sided low back pain, a constant pain. Finds it difficult to sleep confortably. HX of right knee surgeries, damage. Back Pain  This is a chronic problem. The current episode started more than 1 year ago. The pain radiates to the right foot (also up the back). The symptoms are aggravated by bending and lying down. Pertinent negatives include no abdominal pain, bladder incontinence, bowel incontinence, chest pain, dysuria, fever, headaches, numbness, paresthesias, pelvic pain or perianal numbness. Review of Systems   Constitutional: Negative for appetite change, fever and unexpected weight change. HENT: Negative for hearing loss and trouble swallowing. Eyes: Negative for visual disturbance. Respiratory: Negative for shortness of breath and wheezing. Cardiovascular: Negative for chest pain and palpitations. Gastrointestinal: Negative for abdominal pain, blood in stool, bowel incontinence, diarrhea and vomiting. Endocrine: Negative for polydipsia and polyuria. Genitourinary: Negative for bladder incontinence, dysuria, frequency, hematuria and pelvic pain. Musculoskeletal: Positive for arthralgias and back pain. Negative for myalgias and neck pain. Neurological: Positive for seizures. Negative for numbness, headaches and paresthesias. Psychiatric/Behavioral: Negative for suicidal ideas. The patient is not nervous/anxious. Prior to Visit Medications    Medication Sig Taking? Authorizing Provider   clobetasol (TEMOVATE) 0.05 % ointment Apply topically 2 times daily. Yes SHAINA Avendaño CNP   cyclobenzaprine (FLEXERIL) 5 MG tablet Take 1 tablet by mouth nightly Yes SHAINA Avendaño CNP   divalproex (DEPAKOTE ER) 250 MG extended release tablet Take 250 mg by mouth 2 times daily Yes Historical Provider, MD   zonisamide (ZONEGRAN) 100 MG capsule TAKE 2 CAPSULES BY MOUTH THREE TIMES A DAY. Yes Historical Provider, MD   rizatriptan (MAXALT) 10 MG tablet  Yes Historical Provider, MD   losartan (COZAAR) 25 MG tablet TAKE 1/2 TABLET BY MOUTH DAILY Yes Historical Provider, MD   carvedilol (COREG) 3.125 MG tablet  Yes Historical Provider, MD   Multiple Vitamins-Minerals (MULTIVITAL-M PO) Take by mouth daily Yes Historical Provider, MD   acetaminophen (TYLENOL) 325 MG tablet Take 1 tablet by mouth every 6 hours as needed for Pain Yes Elías Thomas MD   ibuprofen (ADVIL;MOTRIN) 800 MG tablet Take 1 tablet by mouth every 8 hours as needed for Pain Yes Elías Thomas MD   levETIRAcetam (KEPPRA) 750 MG tablet Take 2,000 mg by mouth 2 times daily. Yes Historical Provider, MD   Amphetamine-Dextroamphetamine (AMPHETAMINE SALT COMBO PO) Take 20 mg by mouth 2 times daily. Yes Historical Provider, MD   Clobazam (ONFI PO) Take 10 mg by mouth 2 times daily.  Yes Historical Provider, MD        Allergies   Allergen Reactions    Lisinopril Rash    Rufinamide Nausea And Vomiting     Increased seizures, nausea, memory issues  Other reaction(s): GI Upset  Increased seizures, nausea, memory issues         Past Medical History:   Diagnosis Date    ADHD     Depression  [facility]     Migraine     Mitral valve prolapse     Status post VNS (vagus nerve stimulator) placement        Past Surgical History:   Procedure Laterality Date    KNEE ARTHROSCOPY      SHOULDER SURGERY Left     TONSILLECTOMY      VASCULAR SURGERY Bilateral     Ligation and strip       Social History     Socioeconomic History    Marital status: Single     Spouse name: Not on file    Number of children: Not on file    Years of education: Not on file    Highest education level: Not on file   Occupational History    Not on file   Tobacco Use    Smoking status: Never Smoker    Smokeless tobacco: Never Used   Substance and Sexual Activity    Alcohol use: Yes     Comment: occ    Drug use: Never    Sexual activity: Not on file   Other Topics Concern    Not on file   Social History Narrative    Not on file     Social Determinants of Health     Financial Resource Strain: Low Risk     Difficulty of Paying Living Expenses: Not hard at all   Food Insecurity: No Food Insecurity    Worried About Running Out of Food in the Last Year: Never true    Mohini of Food in the Last Year: Never true   Transportation Needs:     Lack of Transportation (Medical): Not on file    Lack of Transportation (Non-Medical):  Not on file   Physical Activity:     Days of Exercise per Week: Not on file    Minutes of Exercise per Session: Not on file   Stress:     Feeling of Stress : Not on file   Social Connections:     Frequency of Communication with Friends and Family: Not on file    Frequency of Social Gatherings with Friends and Family: Not on file    Attends [employer] Services: Not on file    Active Member of Clubs or Organizations: Not on file    Attends Club or Organization Meetings: Not on file    Marital Status: Not on file   Intimate Partner Violence:     Fear of Current or Ex-Partner: Not on file    Emotionally Abused: Not on file    Physically Abused: Not on file    Sexually Abused: Not on file Housing Stability:     Unable to Pay for Housing in the Last Year: Not on file    Number of Places Lived in the Last Year: Not on file    Unstable Housing in the Last Year: Not on file        No family history on file. Vitals:    06/15/22 1406   BP: 127/79   Site: Left Upper Arm   Position: Sitting   Pulse: 83   Temp: 99.5 °F (37.5 °C)   TempSrc: Infrared   SpO2: 97%   Weight: 181 lb (82.1 kg)     Estimated body mass index is 21.46 kg/m² as calculated from the following:    Height as of 2/7/22: 6' 5\" (1.956 m). Weight as of this encounter: 181 lb (82.1 kg). Physical Exam  Constitutional:       Appearance: Normal appearance. Pulmonary:      Effort: Pulmonary effort is normal.      Breath sounds: Normal breath sounds. Musculoskeletal:      Thoracic back: Scoliosis present. Lumbar back: Scoliosis present. Skin:     Findings: Rash present. Rash is papular and scaling. Neurological:      Mental Status: He is alert. ASSESSMENT     Diagnosis Orders   1. Poison ivy  clobetasol (TEMOVATE) 0.05 % ointment   2. Colon cancer screening  Fecal DNA Colorectal cancer screening (Cologuard)   3. Chronic right-sided low back pain with right-sided sciatica  cyclobenzaprine (FLEXERIL) 5 MG tablet    [facility]   4. Scoliosis of lumbosacral spine, unspecified scoliosis type  cyclobenzaprine (FLEXERIL) 5 MG tablet    [facility]   5. Lipid screening  Lipid, Fasting   6. Encounter for screening for HIV  HIV Screen   7. Need for hepatitis C screening test  Hepatitis C Antibody          PLAN:  Orders Placed This Encounter   Medications    clobetasol (TEMOVATE) 0.05 % ointment     Sig: Apply topically 2 times daily. Dispense:  30 g     Refill:  0    cyclobenzaprine (FLEXERIL) 5 MG tablet     Sig: Take 1 tablet by mouth nightly     Dispense:  30 tablet     Refill:  0         1. Health maintenance reviewed, screenings and labs as ordered above. Patient agrees. 2. Routine risk for colon cancer, no immediate family history. Cologuard ordered. 3. Agreeable at this time for physical therapy along with use of muscle relaxer at bedtime for acute on chronic right-sided low back pain. FOLLOW UP AND INSTRUCTIONS:  Return in about 6 months (around 12/15/2022). · Bernardo Lane received counseling on the following healthy behaviors:exercise and medication adherence    · Discussed use, benefit, and side effects of prescribed medications. Barriers to  medication compliance addressed. All patient questions answered. Pt voiced  understanding. · Patient given educational materials - see patient instructions    · Patient advised to contact scheduling offices for any referrals or imaging orders  placed today if they have not been contacted in 48 hours. Return in about 6 months (around 12/15/2022). An  electronic signature was used to authenticate this note. --SHAINA Bishop CNP on 6/15/2022 at 3:09 PM  Visit Information    Have you changed or started any medications since your last visit including any over-the-counter medicines, vitamins, or herbal medicines? no   Are you having any side effects from any of your medications? -  no  Have you stopped taking any of your medications? Is so, why? -  no    Have you seen any other physician or provider since your last visit? No  Have you had any other diagnostic tests since your last visit? No  Have you been seen in the emergency room and/or had an admission to a hospital since we last saw you? No  Have you had your routine dental cleaning in the past 6 months? no    Have you activated your [facility] account? If not, what are your barriers?  Yes     Patient Care Team:  SHAINA Bishop CNP as PCP - General (Family Medicine)  Arminda Barnett MD as PCP - REHABIL[facility]ATION [facility] Provider    Medical History Review  Past Medical, Family, and Social History reviewed and does not contribute to the patient presenting condition    Health Maintenance   Topic Date Due    HIV screen  Never done    Hepatitis C screen  Never done    Colorectal Cancer Screen  Never done    Lipids  06/15/2022    Depression Screen  01/27/2023    DTaP/Tdap/Td vaccine (2 - Td or Tdap) 03/03/2024    Flu vaccine  Completed    COVID-19 Vaccine  Completed    Hepatitis A vaccine  Aged Out    Hepatitis B vaccine  Aged Out    Hib vaccine  Aged Out    Meningococcal (ACWY) vaccine  Aged Out    Pneumococcal 0-64 years Vaccine  Aged Out

## 2022-07-27 LAB — NONINV COLON CA DNA+OCC BLD SCRN STL QL: NEGATIVE

## 2022-08-10 ENCOUNTER — HOSPITAL ENCOUNTER (EMERGENCY)
Age: 47
Discharge: HOME | End: 2022-08-10
Attending: EMERGENCY MEDICINE
Payer: MEDICARE

## 2022-08-10 VITALS
HEIGHT: 77 IN | SYSTOLIC BLOOD PRESSURE: 134 MMHG | OXYGEN SATURATION: 100 % | WEIGHT: 175 LBS | TEMPERATURE: 96.3 F | BODY MASS INDEX: 20.66 KG/M2 | RESPIRATION RATE: 18 BRPM | DIASTOLIC BLOOD PRESSURE: 87 MMHG | HEART RATE: 62 BPM

## 2022-08-10 DIAGNOSIS — R21 RASH AND OTHER NONSPECIFIC SKIN ERUPTION: Primary | ICD-10-CM

## 2022-08-10 PROCEDURE — 96372 THER/PROPH/DIAG INJ SC/IM: CPT

## 2022-08-10 PROCEDURE — 6360000002 HC RX W HCPCS: Performed by: STUDENT IN AN ORGANIZED HEALTH CARE EDUCATION/TRAINING PROGRAM

## 2022-08-10 PROCEDURE — 99284 EMERGENCY DEPT VISIT MOD MDM: CPT

## 2022-08-10 RX ORDER — PREDNISONE 20 MG/1
TABLET ORAL
Qty: 42 TABLET | Refills: 0 | Status: SHIPPED | OUTPATIENT
Start: 2022-08-10 | End: 2022-08-31

## 2022-08-10 RX ORDER — DEXAMETHASONE SODIUM PHOSPHATE 10 MG/ML
10 INJECTION INTRAMUSCULAR; INTRAVENOUS ONCE
Status: COMPLETED | OUTPATIENT
Start: 2022-08-10 | End: 2022-08-10

## 2022-08-10 RX ADMIN — DEXAMETHASONE SODIUM PHOSPHATE 10 MG: 10 INJECTION INTRAMUSCULAR; INTRAVENOUS at 19:31

## 2022-08-10 ASSESSMENT — ENCOUNTER SYMPTOMS
ABDOMINAL PAIN: 0
NAUSEA: 0
DIARRHEA: 0
CHOKING: 0
CHEST TIGHTNESS: 0
SHORTNESS OF BREATH: 0

## 2022-08-10 ASSESSMENT — PAIN - FUNCTIONAL ASSESSMENT: PAIN_FUNCTIONAL_ASSESSMENT: NONE - DENIES PAIN

## 2022-08-10 NOTE — ED NOTES
Exposure to poison Ivy and Saturday hx of extensive reaction. Today continues.       Isamar Duran RN  08/10/22 9961

## 2022-08-10 NOTE — ED PROVIDER NOTES
[facility] ED  Emergency Department Encounter  EmergencyMedicineResident     This patient was seen during the COVID-19 crisis. There were limited resources and those resources we did have had to be conserved for the sickest of patients. Pt Name: Ag Gonzalez  MRN: [id number]  [place] 1975  Date of evaluation: 8/10/22  PCP: SHAINA Panchal [id number]       Chief Complaint   Patient presents with    Rash     Pt stated notice rash on Monday. HISTORY OF PRESENT ILLNESS  (Location/Symptom, Timing/Onset, Context/Setting, Quality, Duration, Modifying Factors, Severity.)      Ag Gonzalez is a 56 y.o. male who presents evaluation today of diffuse rash over extremities and trunk. Patient states that he was pulling weeds at his mother's house and believes he got poison ivy. It presents on his arms his chest his lower back lower abdomen and legs. Very itchy described with the patient. He has not attempted anything for relief yet. He had a several times in the past where he is required steroids for treatment. PAST MEDICAL / SURGICAL /SOCIAL / FAMILY HISTORY      has a past medical history of ADHD, Depression, Epilepsy ([facility] Migraine, Mitral valve prolapse, and Status post VNS (vagus nerve stimulator) placement. has a past surgical history that includes Knee arthroscopy; shoulder surgery (Left); vascular surgery (Bilateral); and Tonsillectomy.       Social History     Socioeconomic History    Marital status: Single     Spouse name: Not on file    Number of children: Not on file    Years of education: Not on file    Highest education level: Not on file   Occupational History    Not on file   Tobacco Use    Smoking status: Never    Smokeless tobacco: Never   Substance and Sexual Activity    Alcohol use: Yes     Comment: occ    Drug use: Never    Sexual activity: Not on file   Other Topics Concern    Not on file   Social History Narrative    Not on file Social Determinants of Health     Financial Resource Strain: Low Risk     Difficulty of Paying Living Expenses: Not hard at all   Food Insecurity: No Food Insecurity    Worried About Running Out of Food in the Last Year: Never true    Ran Out of Food in the Last Year: Never true   Transportation Needs: Not on file   Physical Activity: Not on file   Stress: Not on file   Social Connections: Not on file   Intimate Partner Violence: Not on file   Housing Stability: Not on file       No family history on file. Allergies:  Lisinopril and Rufinamide    Home Medications:  Prior to Admission medications    Medication Sig Start Date End Date Taking? Authorizing Provider   predniSONE (DELTASONE) 20 MG tablet Take 3 tablets by mouth daily for 7 days, THEN 2 tablets daily for 7 days, THEN 1 tablet daily for 7 days. 8/10/22 8/31/22 Yes Vanesa Zhou MD   clobetasol (TEMOVATE) 0.05 % ointment Apply topically 2 times daily. 6/15/22   SHAINA Chavez - CNP   divalproex (DEPAKOTE ER) 250 MG extended release tablet Take 250 mg by mouth 2 times daily 1/3/22 7/2/22  Historical Provider, MD   zonisamide (ZONEGRAN) 100 MG capsule TAKE 2 CAPSULES BY MOUTH THREE TIMES A DAY. 1/7/22   Historical Provider, MD   rizatriptan (MAXALT) 10 MG tablet  1/7/22   Historical Provider, MD   losartan (COZAAR) 25 MG tablet TAKE 1/2 TABLET BY MOUTH DAILY 1/19/22   Historical Provider, MD   carvedilol (COREG) 3.125 MG tablet  1/7/22   Historical Provider, MD   Multiple Vitamins-Minerals (MULTIVITAL-M PO) Take by mouth daily    Historical Provider, MD   acetaminophen (TYLENOL) 325 MG tablet Take 1 tablet by mouth every 6 hours as needed for Pain 7/28/19   Bob Yuan MD   ibuprofen (ADVIL;MOTRIN) 800 MG tablet Take 1 tablet by mouth every 8 hours as needed for Pain 7/28/19   Bob Yuan MD   levETIRAcetam (KEPPRA) 750 MG tablet Take 2,000 mg by mouth 2 times daily.     Historical Provider, MD   Amphetamine-Dextroamphetamine (AMPHETAMINE SALT COMBO PO) Take 20 mg by mouth 2 times daily. Historical Provider, MD   Clobazam (ONFI PO) Take 10 mg by mouth 2 times daily. Historical Provider, MD       REVIEW OF SYSTEMS    (2-9 systems for level 4, 10 or more forlevel 5)      Review of Systems   Constitutional:  Negative for chills and fever. Respiratory:  Negative for choking, chest tightness and shortness of breath. Cardiovascular:  Negative for chest pain. Gastrointestinal:  Negative for abdominal pain, diarrhea and nausea. Skin:  Positive for rash and wound. PHYSICAL EXAM   (up to 7 for level 4, 8 or more forlevel 5)      ED TRIAGE VITALS BP: 134/87, Temp: (!) 96.3 °F (35.7 °C), Heart Rate: 62, Resp: 18, SpO2: 100 %    Vitals:    08/10/22 1917 08/10/22 1918   BP:  134/87   Pulse: 62    Resp: 18    Temp: (!) 96.3 °F (35.7 °C)    TempSrc: Oral    SpO2: 100%    Weight: 175 lb (79.4 kg)    Height: 6' 5\" (1.956 m)          Physical Exam  Vitals and nursing note reviewed. Constitutional:       Appearance: Normal appearance. HENT:      Head: Normocephalic and atraumatic. Nose: Nose normal.      Mouth/Throat:      Mouth: Mucous membranes are moist.   Eyes:      Extraocular Movements: Extraocular movements intact. Pupils: Pupils are equal, round, and reactive to light. Cardiovascular:      Rate and Rhythm: Normal rate and regular rhythm. Pulses: Normal pulses. Heart sounds: Normal heart sounds. Pulmonary:      Effort: Pulmonary effort is normal.      Breath sounds: Normal breath sounds. Abdominal:      General: Abdomen is flat. Palpations: Abdomen is soft. Musculoskeletal:         General: Normal range of motion. Cervical back: Normal range of motion. Skin:     General: Skin is warm and dry. Capillary Refill: Capillary refill takes less than 2 seconds.       Comments: Diffuse raised red urticarial rash present over the extremities and trunk   Neurological:      General: No focal deficit present. Mental Status: He is alert and oriented to person, place, and time. Psychiatric:         Mood and Affect: Mood normal.         Behavior: Behavior normal.         DIFFERENTIAL  DIAGNOSIS     PLAN (LABS / IMAGING / EKG):  No orders of the defined types were placed in this encounter. MEDICATIONS ORDERED:  ED Medication Orders (From admission, onward)      Start Ordered     Status Ordering Provider    08/10/22 1930 08/10/22 1927  dexamethasone (DECADRON) injection 10 mg  ONCE         Last MAR action: Given - by Susanna Quintanilla on 08/10/22 at [address] / [address] [facility]     IMPRESSION & INITIAL PLAN:  Plan for Decadron therapy. Discharge home with prednisone taper over 21 days. Clinical presentation and history suggestive of contact dermatitis likely poison ivy. LABS:  No results found for this visit on 08/10/22. RADIOLOGY:  No orders to display     CONSULTS:  None    CRITICAL CARE:  See attending physician note    FINAL IMPRESSION      1. Rash and other nonspecific skin eruption          DISPOSITION / PLAN     DISPOSITION Decision To Discharge 08/10/2022 07:27:08 PM      PATIENT REFERRED TO:  No follow-up provider specified. DISCHARGE MEDICATIONS:  Discharge Medication List as of 8/10/2022  7:30 PM        START taking these medications    Details   predniSONE (DELTASONE) 20 MG tablet Take 3 tablets by mouth daily for 7 days, THEN 2 tablets daily for 7 days, THEN 1 tablet daily for 7 days. , Disp-42 tablet, R-0Print           Discharge Medication List as of 8/10/2022  7:30 PM           Bonita Menjivar MD  Emergency Medicine Resident    (Please note that portions of this note were completed with a voice recognition program.  Efforts were made to edit the dictations but occasionally words are mis-transcribed.)       Bonita Menjivar MD  Resident  08/10/22 6042

## 2022-08-10 NOTE — ED PROVIDER NOTES
[address] [facility]     Emergency Department     Faculty Attestation        I performed a history and physical examination of the patient and discussed management with the resident. I reviewed the residents note and agree with the documented findings and plan of care. Any areas of disagreement are noted on the chart. I was personally present for the key portions of any procedures. I have documented in the chart those procedures where I was not present during the key portions. I have reviewed the emergency nurses triage note. I agree with the chief complaint, past medical history, past surgical history, allergies, medications, social and family history as documented unless otherwise noted below. For mid-level providers such as nurse practitioners as well as physicians assistants:    I have personally seen and evaluated the patient. I find the patient's history and physical exam are consistent with NP/PA documentation. I agree with the care provided, treatment rendered, disposition, & follow-up plan. Additional findings are as noted.     Vital Signs: /87   Pulse 62   Temp (!) 96.3 °F (35.7 °C) (Oral)   Resp 18   Ht 6' 5\" (1.956 m)   Wt 175 lb (79.4 kg)   SpO2 100%   BMI 20.75 kg/m²   PCP:  SHAINA Brice - CNP    Pertinent Comments:           Critical Care  None          Lalito Pollard MD    Attending Emergency Medicine Physician            Cristopher Rowley MD  08/10/22 1929

## 2022-08-12 ENCOUNTER — OFFICE VISIT (OUTPATIENT)
Dept: PRIMARY CARE CLINIC | Age: 47
End: 2022-08-12
Payer: MEDICARE

## 2022-08-12 VITALS
BODY MASS INDEX: 20.63 KG/M2 | SYSTOLIC BLOOD PRESSURE: 112 MMHG | DIASTOLIC BLOOD PRESSURE: 74 MMHG | TEMPERATURE: 97.4 F | WEIGHT: 174 LBS | OXYGEN SATURATION: 98 % | HEART RATE: 65 BPM

## 2022-08-12 DIAGNOSIS — L23.7 POISON IVY: Primary | ICD-10-CM

## 2022-08-12 PROCEDURE — 99213 OFFICE O/P EST LOW 20 MIN: CPT | Performed by: NURSE PRACTITIONER

## 2022-08-12 PROCEDURE — G8420 CALC BMI NORM PARAMETERS: HCPCS | Performed by: NURSE PRACTITIONER

## 2022-08-12 PROCEDURE — G8427 DOCREV CUR MEDS BY ELIG CLIN: HCPCS | Performed by: NURSE PRACTITIONER

## 2022-08-12 PROCEDURE — 1036F TOBACCO NON-USER: CPT | Performed by: NURSE PRACTITIONER

## 2022-08-12 ASSESSMENT — ENCOUNTER SYMPTOMS
WHEEZING: 0
EYE ITCHING: 0
EYE REDNESS: 0
EYE DISCHARGE: 0
EYE PAIN: 0
SHORTNESS OF BREATH: 0

## 2022-08-12 NOTE — PROGRESS NOTES
[facility]  [address] [place] [id number]  Dept: [phone]  Dept Fax: [phone]    --Yolie Lovett MA on 8/12/2022 at 10:48 AM  Visit Information    Have you changed or started any medications since your last visit including any over-the-counter medicines, vitamins, or herbal medicines? no   Are you having any side effects from any of your medications? -  no  Have you stopped taking any of your medications? Is so, why? -  no    Have you seen any other physician or provider since your last visit? Yes - Records Obtained  Have you had any other diagnostic tests since your last visit? Yes - Records Obtained  Have you been seen in the emergency room and/or had an admission to a hospital since we last saw you? Yes - Records Obtained  Have you had your routine dental cleaning in the past 6 months? no    Have you activated your [facility] account? If not, what are your barriers?  Yes     Patient Care Team:  SHAINA Carrion CNP as PCP - General (Family Medicine)  SHAINA Carrion CNP as PCP - [facility] Empaneled Provider    Medical History Review  Past Medical, Family, and Social History reviewed and does not contribute to the patient presenting condition    Health Maintenance   Topic Date Due    HIV screen  Never done    Hepatitis C screen  Never done    Lipids  06/15/2022    Flu vaccine (1) 09/01/2022    Depression Screen  06/15/2023    DTaP/Tdap/Td vaccine (2 - Td or Tdap) 03/03/2024    Colorectal Cancer Screen  07/18/2025    COVID-19 Vaccine  Completed    Hepatitis A vaccine  Aged Out    Hepatitis B vaccine  Aged Out    Hib vaccine  Aged Out    Meningococcal (ACWY) vaccine  Aged Out    Pneumococcal 0-64 years Vaccine  Aged Out

## 2022-08-12 NOTE — PROGRESS NOTES
[facility]  [address] [address]  Dept: [phone]  Dept Fax: [phone]    Pham Duran (:  1975) is a 56 y.o. male,Established patient, here for evaluation of the following chief complaint(s):  Rash (ED F/U)    Marlen Masters today for ER follow-up, seen at [facility]. [facility] on 8/10/2022 with complaints of a rash. Believed to be a result of contact with poison ivy, patient reported pulling weeds prior to rash starting. Rash to arms and chest as well as lower back abdomen and legs. Patient treated with prednisone taper over 21 days and received Decadron injection. ASSESSMENT/PLAN:  1. Poison ivy    No follow-ups on file. Subjective   SUBJECTIVE/OBJECTIVE:  Patient doing well, started oral steroid taper yesterday. States Decadron injection did help alleviate itching. Denies shortness of breath, trouble swallowing, or eye pain/redness. Review of Systems   Constitutional:  Negative for chills and fever. Eyes:  Negative for pain, discharge, redness and itching. Respiratory:  Negative for shortness of breath and wheezing. Skin:  Positive for rash.         Objective     DIAGNOSTIC FINDINGS:  CBC:  Lab Results   Component Value Date/Time    WBC 4.5 2020 03:16 PM    HGB 14.0 2020 03:16 PM     2020 03:16 PM     2012 08:34 PM       BMP:    Lab Results   Component Value Date/Time     2020 03:16 PM    K 4.3 2020 03:16 PM     2020 03:16 PM    CO2 23 2020 03:16 PM    BUN 12 2020 03:16 PM    CREATININE 0.89 2020 03:16 PM    GLUCOSE 99 2020 03:16 PM    GLUCOSE 85 2012 08:34 PM       HEMOGLOBIN A1C: No results found for: LABA1C    FASTING LIPID PANEL:  Lab Results   Component Value Date    CHOL 177 06/15/2017    HDL 68 06/15/2017    TRIG 59 06/15/2017       Physical Exam  Constitutional:       Appearance: Normal appearance. He is not ill-appearing. HENT:      Head: Normocephalic. Eyes:      General: No scleral icterus. Conjunctiva/sclera: Conjunctivae normal.   Pulmonary:      Effort: Pulmonary effort is normal.      Breath sounds: Normal breath sounds. Skin:     Findings: Rash (Maculopapular rash present to lower extremities, trunk and upper extremities. No vesicles present.) present. Neurological:      Mental Status: He is alert. An electronic signature was used to authenticate this note.     --SHAINA Marquez - CNP

## 2022-08-29 ENCOUNTER — HOSPITAL ENCOUNTER (OUTPATIENT)
Dept: PHYSICAL THERAPY | Age: 47
Setting detail: THERAPIES SERIES
Discharge: HOME | End: 2022-08-29
Payer: MEDICARE

## 2022-08-29 PROCEDURE — 97161 PT EVAL LOW COMPLEX 20 MIN: CPT

## 2022-08-29 NOTE — CONSULTS
[x] [facility]) - [facility] &  Therapy  [address]  [phone]  F: [phone] [] [address]  [facility] 36   [address]  P: [phone]  F: [phone] [] [address] &  Therapy  [address]  P: [phone]  F: [phone] [] [address]  P: [phone]  F: [phone] [] [address]  [facility]   [address]   [place]: [phone]  F: [phone]      Physical Therapy Spine Evaluation    Date:  2022  Patient: Ubaldo Jo  : 1975  MRN: [id number]  Physician: SHAINA Reynoso - CNP     Insurance: Kanika Harrison (1-AUTH AFTER EVAL)   Medical Diagnosis: Chronic right-sided low back pain with right-sided sciatica (M54.41,G89.29 [ICD-10-CM]); Scoliosis of lumbosacral spine, unspecified scoliosis type (M41.9 [ICD-10-CM])    Rehab Codes: M54.59, M25.60, R29.3, M62.81  Onset Date: 6/15/22  Next 's appt.:     Subjective:   CC: Intermittent right-sided low back pain with shooting pain down right LE when the pain gets severe; Difficulty bending forward (mostly upon returning to upright); + sleep disturbances  HPI: (onset date): Pt was diagnosed with scoliosis when he was getting ready to start high school, was right on the edge of needing a brace or not and they decided against it. 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 The pt reports multiple injuries to his body and multiple surgeries including his right knee and left shoulder. Pt also states that he was an  for ten years where he would be bent over for hours at a time and have difficulty then returning to upright position.       PMHx: [] Unremarkable [] Diabetes [] HTN  [] Pacemaker   [] MI/Heart Problems [] Cancer [] Arthritis [x] Other: Epilepsy, continues to have weekly seizures              [x] Refer to full medical chart  In EPIC     **Has a VNS stimulator due to Epilepsy, voice goes hoarse every few minutes due to this. Pt goes to the [facility]. Comorbidities:   [] Obesity [] Dialysis  [] N/A   [] Asthma/COPD [] Dementia [] Other:   [] Stroke [] Sleep apnea [] Other:   [] Vascular disease [] Rheumatic disease [] Other:     Tests: [] X-Ray: [] MRI:  [] Other:    Medications: [] Refer to full medical record [] None [] Other:  Allergies:      [] Refer to full medical record [] None [] Other:    Function:  Hand Dominance  [x] Right  [] Left  Patient lives with: Wife and kids   In what type of home []  One story   [x] Two story   [] Split level   Number of stairs to enter 5   With handrail on the []  Right to enter   [] Left to enter   Bathroom has a []  Tub only  [x] Tub/shower combo   [] Walk in shower    []  Grab bars   Washing machine is on []  Main level   [] Second level   [] Basement   Employer    Job Status []  Normal duty   [] Light duty   [] Off due to condition    []  Retired   [] Not employed   [x] Disability  [] Other:  []  Return to work:    Work activities/duties        ADL/IADL Previous level of function Current level of function Who currently assists the patient with task   Bathing  [x] Independent  [] Assist [] Independent  [] Assist    Dress/grooming [x] Independent  [] Assist [] Independent  [] Assist    Transfer/mobility [x] Independent  [] Assist [] Independent  [] Assist    Feeding [x] Independent  [] Assist [] Independent  [] Assist    Toileting [x] Independent  [] Assist [] Independent  [] Assist    Driving [] Independent  [x] Assist [] Independent  [x] Assist Does not drive due to Epilepsy    Housekeeping [x] Independent  [] Assist [x] Independent  [] Assist    Grocery shop/meal prep [x] Independent  [] Assist [x] Independent  [] Assist      Gait Prior level of function Current level of function    [x] Independent  [] Assist [x] Independent  [] Assist   Device: [] Independent [] Independent    [] Straight Cane [] Quad cane [] Straight Cane [] Quad cane    [] Standard walker [] Rolling walker   [] 4 wheeled walker [] Standard walker [] Rolling walker   [] 4 wheeled walker    [] Wheelchair [] Wheelchair     Pain:  [x] Yes  [] No Location: R-sided low back pain Pain Rating: (0-10 scale) 5-6/10  Pain altered Tx:  [x] Yes  [] No  Action:    Symptoms:  [] Improving [x] Worsening [] Same  Better:  [] AM    [] PM    [] Sit    [] Rise/Sit    []Stand    [] Walk    [] Lying    [x] Other: heat pad, cold pack, back brace, good posture, massage pressure  Worse: [] AM    [] PM    [] Sit    [] Rise/Sit    []Stand    [] Walk    [] Lying    [x] Bend                      [] Valsalva    [] Other:  Sleep: [] OK    [x] Disturbed    Objective:      STRENGTH  STRENGTH  ROM    Left Right  Left Right Cervical    C5 Shld Abd   L1-2 Hip Flex 5 5 Flexion    Shld Flexion   Hip Abd   Extension    Shld IR   L3-4 Knee Ext 5 5 Rotation L R   Shld ER   L4 Ankle DF 5 5 Sidebend L R   C6 Elb Flex   L5 EHL   Retraction    C7 Elb Ext   S1 Plant. Flex 5 5 Lumbar    C8 EPL   Abdominals   Flexion Wfl *   T1 Fing Abd   Erector Spinae   Extension 10°         Rotation L 50% limited R 50% limited         Sidebend L wfl R 25% limited         UE/LE                                                            *Pt has significant difficulty returning to an upright position after bending forward, requires use of hands on thighs      TESTS (+/-) LEFT RIGHT Not Tested   SLR [] sit [x] supine - - []   Hamstring (SLR) - + []   SKTC - - []   DKTC   []   Slump/Dural   []   SI JT   []   TIO - - []   Joint Mobility   []   Cerv. Comp   []   Cerv. Distraction   []   Cerv. Alar/Transverse   []   Vertebral Artery   []   Adsons   []   Ambar Mungo   []   Pavithra Tests ? Pain ?  Pain No Change Not Tested   RFIS [] [] [] [x]   CAITLIN [] [] [] [x]   RFIL [] [] [] [x] REIL [] [] [] [x]   Rep Prot [] [] [] []   Rep Retract [] [] [] []       OBSERVATION No Deficit Deficit Not Tested Comments   Posture       Forward Head [] [] []    Rounded Shoulders [] [] []    Kyphosis [] [] []    Lordosis [] [x] [] Reduced lumbar lordosis   Lateral Shift [] [] []    Scoliosis [] [x] [] Appears to have dextroscoliosis (convexity to the right)   Iliac Crest [] [] []    PSIS [] [] []    ASIS [] [] []    Leg Length Discrp [] [] []    Slumped Sitting [] [] []    Palpation [] [x] [] Mild tenderness to palpation right lower lumbar spinal region   Sensation [x] [] []    Edema [x] [] []    Neurological [] [] []        Functional Test: Oswestry Score: 52% functionally impaired     Comments:    Assessment:  Patient would benefit from skilled physical therapy services in order to: address low back pain and R LE pain, stiffness of the thoracic and lumbar spine, core weakness, postural impairments, and functional impairments including difficulty sleeping at night and difficulty bending forward. Problems:    [x] ? Pain:  [x] ? ROM:  [x] ? Strength:  [x] ? Function:  [] Other:      STG: (to be met in 8 treatments)  ? Pain: Decrease low back pain to 4/10 on average, pt will also report a reduction in R LE symptoms  ? ROM: Increase lumbar extension to 20°, bilateral lumbar rotation to 25% limited, and R sidebend to [facility]  ? Strength: Increase core strength as demonstrated by progression of DLS exercises; Increase scapular strength as demonstrated by progression of PRE's  ? Function: Improve Oswestry to 42% impairment  Patient to be independent with home exercise program as demonstrated by performance with correct form without cues. LTG: (to be met in 12 treatments)  Pt will be able to return from a bent forward position with little difficulty  Pt will report improvement in sleep to less than one-quarter  Improve Oswestry to 32% impairment      Patient goals:  To move freely, sleep through the night, not think about it all the time. Rehab Potential:  [x] Good  [] Fair  [] Poor   Suggested Professional Referral:  [x] No  [] Yes:  Barriers to Goal Achievement:  [x] No  [] Yes:  Domestic Concerns:  [x] No  [] Yes:    Pt. Education:  [x] Plans/Goals, Risks/Benefits discussed  [x] Home exercise program--Discussed PT POC with the pt  Method of Education: [x] Verbal  [] Demo  [] Written  Comprehension of Education:  [x] Verbalizes understanding. [] Demonstrates understanding. [] Needs Review. [] Demonstrates/verbalizes understanding of HEP/Ed previously given. Treatment Plan:  [x] Therapeutic Exercise   36063  [] Iontophoresis: 4 mg/mL Dexamethasone Sodium Phosphate  mAmin  62727   [] Therapeutic Activity  16696 [] Vasopneumatic cold with compression  32305    [] Gait Training   42338 [] Ultrasound   47566   [] Neuromuscular Re-education  79067 [] Electrical Stimulation Unattended  04725   [x] Manual Therapy  19063 [] Electrical Stimulation Attended  69084   [x] Instruction in HEP  [] Lumbar/Cervical Traction  95800   [] Aquatic Therapy   59485 [x] Cold/hotpack    [] Massage   97779      [] Dry Needling, 1 or 2 muscles  06519   [] Biofeedback, first 15 minutes   20750  [] Biofeedback, additional 15 minutes   35470 [] Dry Needling, 3 or more muscles  01465     []  Medication allergies reviewed for use of    Dexamethasone Sodium Phosphate 4mg/ml     with iontophoresis treatments. Pt is not allergic.     Frequency:  2 x/week for 12 visits    Todays Treatment: No treatment initiated due to pt's insurance requiring prior auth  Modalities:   Precautions: Pt has Epilepsy and has weekly seizures  Exercises:  Exercise Reps/ Time Weight/ Level Comments                                 Other:    Specific Instructions for next treatment: Low back and thoracic stretches, core strengthening, scapular strengthening, postural exercises      Evaluation Complexity:  History (Personal factors, comorbidities) [] 0 [x] 1-2 [] 3+ Exam (limitations, restrictions) [] 1-2 [x] 3 [] 4+   Clinical presentation (progression) [x] Stable [] Evolving  [] Unstable   Decision Making [x] Low [] Moderate [] High    [x] Low Complexity [] Moderate Complexity [] High Complexity       Treatment Charges: Mins Units   [x] Evaluation       [x]  Low       []  Moderate       []  High 35 1   []  Modalities     []  Ther Exercise     []  Manual Therapy     []  Ther Activities     []  Aquatics     []  Vasocompression     []  Other       TOTAL TREATMENT TIME: 35    Time in: 2:15pm      Time out: 3:00pm    Electronically signed by: Maksim Wilcox PT        Physician Signature:________________________________Date:__________________  By signing above or cosigning this note, I have reviewed this plan of care and certify a need for medically necessary rehabilitation services.      *PLEASE SIGN ABOVE AND FAX BACK ALL PAGES*

## 2022-09-12 ENCOUNTER — HOSPITAL ENCOUNTER (OUTPATIENT)
Dept: PHYSICAL THERAPY | Age: 47
Setting detail: THERAPIES SERIES
Discharge: HOME | End: 2022-09-12
Payer: MEDICARE

## 2022-09-12 PROCEDURE — 97110 THERAPEUTIC EXERCISES: CPT

## 2022-09-12 NOTE — FLOWSHEET NOTE
[x] [facility]) [facility] &  Therapy  [address]  [phone]  F: [phone] [] [address]  [facility]   [address]  P: [phone]  F: [phone] [] [address]  [address]  P: [phone]  F: [phone] [] [address]  P: [phone]  F: [phone] [] [address]  [place]   [address]   [place]: [phone]  F: [phone]      Physical Therapy Daily Treatment Note    Date:  2022  Patient Name:  Yulisa Morse    :  1975  MRN: [id number]  Physician: SHAINA Thorpe - ELIZABETH                             Insurance: Sandy Ewing (6 visits approved from -10/30)   Medical Diagnosis: Chronic right-sided low back pain with right-sided sciatica (M54.41,G89.29 [ICD-10-CM]); Scoliosis of lumbosacral spine, unspecified scoliosis type (M41.9 [ICD-10-CM])                          Rehab Codes: M54.59, M25.60, R29.3, M62.81  Onset Date: 6/15/22               Next 's appt. :     Visit# / total visits: 2/6   Cancels/No Shows: 0/0    Subjective:    Pain:  [x] Yes  [] No Location: Right-side of lower back Pain Rating: (0-10 scale) 5/10  Pain altered Tx:  [x] No  [] Yes  Action:  Comments: States that his back has been doing alright since he was here last.  Did a lot of sitting in bleachers lately at his kids' sporting events which can increase in back pain. Objective:  Modalities:   Precautions: Pt has Epilepsy and has weekly seizures.  Pt has a VNS, due to this his voice goes hoarse periodically   Exercises:  Exercise Reps/ Time Weight/ Level Comments   SciFit 5min L2          Tband:      Hip ext 15xea Lime    Hip abd 15xea Lime          Tband:      Lats 15x Lime    Rows 15x Blue    Paloff press 10xea Blue          Mat:      Seated lumbar flexion stretch with stability ball 5x 15\"    Seated lumbar SB stretch with ball 3xea 15\"          Supine:      LTR 5xea 10\"    Bridges 10x 10\"                Other:      Treatment Charges: Mins Units   []  Modalities     [x]  Ther Exercise 45 3   []  Manual Therapy     []  Ther Activities     []  Aquatics     []  Vasocompression     []  Other     Total Treatment time 45        Assessment: [x] Progressing toward goals. Initiated treatment with warm-up on SciFit followed by theraband exercises for bilateral hip, core, and scapular/postural strengthening. Demonstration required for proper technique. Initiated seated flexion stretches for the low back followed by supine stretching and core strengthening exercises. Pt tolerated exercises well without c/o increase in pain. [] No change. [] Other:  [x] Patient would continue to benefit from skilled physical therapy services in order to: address low back pain and R LE pain, stiffness of the thoracic and lumbar spine, core weakness, postural impairments, and functional impairments including difficulty sleeping at night and difficulty bending forward. STG: (to be met in 8 treatments)  ? Pain: Decrease low back pain to 4/10 on average, pt will also report a reduction in R LE symptoms  ? ROM: Increase lumbar extension to 20°, bilateral lumbar rotation to 25% limited, and R sidebend to [facility]  ? Strength: Increase core strength as demonstrated by progression of DLS exercises; Increase scapular strength as demonstrated by progression of PRE's  ? Function: Improve Oswestry to 42% impairment  Patient to be independent with home exercise program as demonstrated by performance with correct form without cues. LTG: (to be met in 12 treatments)  Pt will be able to return from a bent forward position with little difficulty  Pt will report improvement in sleep to less than one-quarter  Improve Oswestry to 32% impairment    Pt. Education:  [x] Yes  [] No  [x] Reviewed Prior HEP/Ed  Method of Education: [x] Verbal  [x] Demo  [] Written  Comprehension of Education:  [x] Verbalizes understanding. [x] Demonstrates understanding. [] Needs review. [] Demonstrates/verbalizes HEP/Ed previously given. Plan: [x] Continue current frequency toward long and short term goals.     [x] Specific Instructions for subsequent treatments: Low back and thoracic stretches, core strengthening, scapular strengthening, postural exercises      Time In: 11:10am            Time Out: 12:04pm    Electronically signed by:  Maksim Wilcox PT

## 2022-09-14 ENCOUNTER — HOSPITAL ENCOUNTER (OUTPATIENT)
Dept: PHYSICAL THERAPY | Age: 47
Setting detail: THERAPIES SERIES
Discharge: HOME | End: 2022-09-14
Payer: MEDICARE

## 2022-09-14 PROCEDURE — 97110 THERAPEUTIC EXERCISES: CPT

## 2022-09-14 NOTE — FLOWSHEET NOTE
[x] [facility]) [facility] &  Therapy  [address]  [phone]  F: [phone] [] [address]  [facility]   [address]  P: [phone]  F: [phone] [] [address]  [address] [facility] [facility] [address]  P: [phone]  F: [phone] [] [address]  P: [phone]  F: [phone] [] [address]  [facility]   [address]   [place]: [phone]  F: [phone]      Physical Therapy Daily Treatment Note    Date:  2022  Patient Name:  Lorelei Joseph    :  1975  MRN: [id number]  Physician: SHAINA Carrion CNP                             Insurance: Patricia Has (6 visits approved from -10/30)   Medical Diagnosis: Chronic right-sided low back pain with right-sided sciatica (M54.41,G89.29 [ICD-10-CM]); Scoliosis of lumbosacral spine, unspecified scoliosis type (M41.9 [ICD-10-CM])                          Rehab Codes: M54.59, M25.60, R29.3, M62.81  Onset Date: 6/15/22               Next 's appt. :     Visit# / total visits:    Cancels/No Shows: 0/0    Subjective:    Pain:  [x] Yes  [] No Location: Right-side of lower back Pain Rating: (0-10 scale) 0/10  Pain altered Tx:  [x] No  [] Yes  Action:  Comments: No pain currently. Reports pain while sitting in the bleachers at a game yesterday. Objective:  Modalities:   Precautions: Pt has Epilepsy and has weekly seizures.  Pt has a VNS, due to this his voice goes hoarse periodically   Exercises:  Exercise Reps/ Time Weight/ Level Comments   SciFit 5min L3          Tband:      Hip ext 15xea Lime    Hip abd 15xea Lime          Tband:      Lats 15x Blue    Rows 15x Douglas    Paloff press 10xea Purple    Trunk rotation  15xea Blue          Mat:      Seated lumbar flexion stretch with stability ball 5x 15\"    Seated lumbar SB stretch with ball 3x 15\" HOLD to the right, too painful         Supine:      LTR 5xea 10\"    Bridges 10x 10\"    Prone press-ups 10x           Other:      Treatment Charges: Mins Units   []  Modalities     [x]  Ther Exercise 60 4   []  Manual Therapy     []  Ther Activities     []  Aquatics     []  Vasocompression     []  Other     Total Treatment time 60        Assessment: [x] Progressing toward goals. Pt able to increase resistance on postural theraband exercises and core exercises this date without difficulty. Added theraband trunk rotation. Demonstration and verbal cues for proper technique. Pt c/o pain with right sidebending stretch, therefore, held further reps and will discontinue exercise. Added prone press-ups to improve lumbar extension. Pt with reduced ROM due to muscle stiffness but otherwise no c/o pain. [] No change. [] Other:  [x] Patient would continue to benefit from skilled physical therapy services in order to: address low back pain and R LE pain, stiffness of the thoracic and lumbar spine, core weakness, postural impairments, and functional impairments including difficulty sleeping at night and difficulty bending forward. STG: (to be met in 8 treatments)  ? Pain: Decrease low back pain to 4/10 on average, pt will also report a reduction in R LE symptoms  ? ROM: Increase lumbar extension to 20°, bilateral lumbar rotation to 25% limited, and R sidebend to [facility]  ? Strength: Increase core strength as demonstrated by progression of DLS exercises; Increase scapular strength as demonstrated by progression of PRE's  ? Function: Improve Oswestry to 42% impairment  Patient to be independent with home exercise program as demonstrated by performance with correct form without cues.   LTG: (to be met in 12 treatments)  Pt will be able to return from a bent forward position with little difficulty  Pt will report improvement in sleep to less than one-quarter  Improve Oswestry to 32% impairment    Pt. Education:  [x] Yes  [] No  [x] Reviewed Prior HEP/Ed  Method of Education: [x] Verbal  [x] Demo  [] Written  Comprehension of Education:  [x] Verbalizes understanding. [x] Demonstrates understanding. [] Needs review. [] Demonstrates/verbalizes HEP/Ed previously given. Plan: [x] Continue current frequency toward long and short term goals.     [x] Specific Instructions for subsequent treatments: Low back and thoracic stretches, core strengthening, scapular strengthening, postural exercises     Time In: 1:01pm            Time Out: 2:10 pm    Electronically signed by:  Lizbeth Nunez, PT

## 2022-09-21 ENCOUNTER — HOSPITAL ENCOUNTER (OUTPATIENT)
Dept: PHYSICAL THERAPY | Age: 47
Setting detail: THERAPIES SERIES
Discharge: HOME | End: 2022-09-21
Payer: MEDICARE

## 2022-09-21 NOTE — FLOWSHEET NOTE
[x] [facility]) - [facility] &  Therapy  [address]    [phone]  F: [phone]   [] [address]  [facility] 36   [address]  P: [phone]  F: [phone]  [] [address] &  Therapy  [address] [facility]  P: [phone]  F: [phone] [] [address]  P: [phone]  F: [phone]  [] [address]  [facility]   [address]   [place]: [phone]  F: [phone]   [] [place] [id number] [facility] [address]  [place]: [phone]   F: [phone]     Physical Therapy Cancel/No Show note    Date: 2022  Patient: Jl Holland  : 1975  MRN: [id number]    Cancels/No Shows to date:     For today's appointment patient:    [x]  Cancelled    [] Rescheduled appointment    [] No-show     Reason given by patient:    [x]  Patient ill    []  Conflicting appointment    [] No transportation      [] Conflict with work    [] No reason given    [] Weather related    [] COVID-19    [] Other:      Comments:        [x] Next appointment was confirmed    Electronically signed by: Barber Parks PT

## 2022-09-23 ENCOUNTER — HOSPITAL ENCOUNTER (OUTPATIENT)
Dept: PHYSICAL THERAPY | Age: 47
Setting detail: THERAPIES SERIES
Discharge: HOME | End: 2022-09-23
Payer: MEDICARE

## 2022-09-23 NOTE — FLOWSHEET NOTE
[x] [facility] - [facility] &  Therapy  [address]    [phone]  F: [phone]   [] [address]  [facility] 36   [address]  P: [phone]  F: [phone]  [] [address] &  Therapy  [address] [facility] [address]  P: [phone]  F: [phone] [] [address]  P: [phone]  F: [phone]  [] [address]  [facility]   [address]   [place]: [phone]  F: [phone]   [] [place] [address] [facility] [address]  [place]: [phone]   F: [phone]     Physical Therapy Cancel/No Show note    Date: 2022  Patient: Ilene Hatch  : 1975  MRN: [id number]    Cancels/No Shows to date:     For today's appointment patient:    [x]  Cancelled    [] Rescheduled appointment    [] No-show     Reason given by patient:    [x]  Patient ill    []  Conflicting appointment    [] No transportation      [] Conflict with work    [] No reason given    [] Weather related    [] COVID-19    [] Other:      Comments:  Pt will need to be scheduled for more appointments.         [] Next appointment was confirmed    Electronically signed by: Nito Duarte PT

## 2022-10-25 ENCOUNTER — HOSPITAL ENCOUNTER (OUTPATIENT)
Dept: PHYSICAL THERAPY | Age: 47
Setting detail: THERAPIES SERIES
Discharge: HOME | End: 2022-10-25
Payer: MEDICARE

## 2022-10-25 PROCEDURE — 97110 THERAPEUTIC EXERCISES: CPT

## 2022-10-25 NOTE — FLOWSHEET NOTE
[x] [facility]  [address]  [phone]  F: [phone]     Physical Therapy Daily Treatment Note    Date:  10/25/2022  Patient Name:  Rosa Paez      :  1975    MRN: [id number]  Physician: SHAINA Prajapati - ELIZABETH                             Insurance: [facility] approval was received for Physical Therapy from [employer]/AIM on 10/26/2022. Approval was received for 4 visits, from 10/31/2022 to 2022  Medical Diagnosis: Chronic right-sided low back pain with right-sided sciatica (M54.41,G89.29 [ICD-10-CM]); Scoliosis of lumbosacral spine, unspecified scoliosis type (M41.9 [ICD-10-CM])                          Rehab Codes: M54.59, M25.60, R29.3, M62.81  Onset Date: 6/15/22                 Next 's appt.: 12/15/22    Visit# / total visits: 2/6   Cancels/No Shows: 0/0    Subjective:    Pain:  [x] Yes  [] No Location: Right-side of lower back   Pain Rating: (0-10 scale) 3-4/10  Pain altered Tx:  [x] No  [] Yes  Action:  Comments: patient reports he just got out of a 9 day stay at [facility] for his Epilepsy. States he was there for symptoms and medication management. Also missed appt's due to his mother just having a heart transplant. Continued low grade R sided low back pain and notes increased stiffness following stay in hospital as he was unable to get out of bed for 9 days. Objective:  Modalities:   Precautions: Pt has Epilepsy and has weekly seizures.  Pt has a VNS, due to this his voice goes hoarse periodically   Exercises:  Exercise Reps/ Time Weight/ Level Comments   SciFit 5min L3          Standing      Tband:      Hip ext 15xea Lime    Hip abd 15xea Lime          Tband:      Lats 15x Blue    Rows 15x Douglas    Paloff press 15x ea Purple    Trunk rotation  15x ea Blue Arms extended         Mat:      Seated lumbar flexion stretch with stability ball 5x 15\"          Supine:      LTR 5xea 10\"    Bridges 2x10 Prone press-ups 10x           Other:      Treatment Charges: Mins Units   []  Modalities     [x]  Ther Exercise 55 4   []  Manual Therapy     []  Ther Activities     []  Aquatics     []  Vasocompression     []  Other     Total Treatment time 55 4       Assessment: [x] Progressing toward goals. Slow to complete charted therex - requiring increased time to complete ex's. No progressions due to fatigue and time out of clinic due to recent hospitalization. Notes of R sided low back pulling throughout Tx, but tolerable. [] No change. [] Other:  [x] Patient would continue to benefit from skilled physical therapy services in order to: address low back pain and R LE pain, stiffness of the thoracic and lumbar spine, core weakness, postural impairments, and functional impairments including difficulty sleeping at night and difficulty bending forward. STG: (to be met in 8 treatments)  ? Pain: Decrease low back pain to 4/10 on average, pt will also report a reduction in R LE symptoms  ? ROM: Increase lumbar extension to 20°, bilateral lumbar rotation to 25% limited, and R sidebend to [facility]  ? Strength: Increase core strength as demonstrated by progression of DLS exercises; Increase scapular strength as demonstrated by progression of PRE's  ? Function: Improve Oswestry to 42% impairment  Patient to be independent with home exercise program as demonstrated by performance with correct form without cues. LTG: (to be met in 12 treatments)  Pt will be able to return from a bent forward position with little difficulty  Pt will report improvement in sleep to less than one-quarter  Improve Oswestry to 32% impairment      Pt. Education:  [x] Yes  [] No  [x] Reviewed Prior HEP/Ed  Method of Education: [x] Verbal  [x] Demo  [] Written  Comprehension of Education:  [x] Verbalizes understanding. [x] Demonstrates understanding. [] Needs review. [] Demonstrates/verbalizes HEP/Ed previously given.      Plan: [x] Continue current frequency toward long and short term goals.     [x] Specific Instructions for subsequent treatments: Low back and thoracic stretches, core strengthening, scapular strengthening, postural exercises       Time In: 1005            Time Out: 1059      Electronically signed by:  Vashti Workman PTA

## 2022-10-26 NOTE — PRE-CERTIFICATION NOTE
[x] [facility]  [address]  [phone]  F: [phone] [] [address]   [address]  P: [phone]  F: [phone] [] [address]  [address]  P: [phone]  F: [phone] [] [address]  [facility]   [address]   [place]: [phone]  F: [phone]  [x] Joanne Cortes [address]   Outpatient Occupational Therapy  [address]: [phone]  F: [phone]          Therapy Pre-certification Note      10/26/2022    Smith Mark  1975   [id number]      Insurance approval was received for Physical Therapy from [facility]/AIM on 10/26/2022. Approval was received for 4 visits, from 10/31/2022 to 11/29/2022. Authorization number [id number].         Electronically signed by Jesenia Rider PT on 10/26/2022 at 10:06 AM

## 2022-10-27 ENCOUNTER — HOSPITAL ENCOUNTER (OUTPATIENT)
Dept: PHYSICAL THERAPY | Age: 47
Setting detail: THERAPIES SERIES
Discharge: HOME | End: 2022-10-27
Payer: MEDICARE

## 2022-10-27 PROCEDURE — 97110 THERAPEUTIC EXERCISES: CPT

## 2022-10-27 NOTE — FLOWSHEET NOTE
[x] [facility]  [address]  [phone]  F: [phone]     Physical Therapy Daily Treatment Note    Date:  10/27/2022  Patient Name:  Johanna Pina      :  1975    MRN: [id number]  Physician: Omar Goetz, APRN - CNP                             Insurance: [facility] approval was received for Physical Therapy from [employer]/A[employer] on 10/26/2022. Approval was received for 4 visits, from 10/31/2022 to 2022  Medical Diagnosis: Chronic right-sided low back pain with right-sided sciatica (M54.41,G89.29 [ICD-10-CM]); Scoliosis of lumbosacral spine, unspecified scoliosis type (M41.9 [ICD-10-CM])                          Rehab Codes: M54.59, M25.60, R29.3, M62.81  Onset Date: 6/15/22                 Next 's appt.: 12/15/22    Visit# / total visits: 5/6   Cancels/No Shows: 0/0    Subjective:    Pain:  [x] Yes  [] No Location: Right-side of lower back   Pain Rating: (0-10 scale) 3-4/10 with specific movements   Pain altered Tx:  [x] No  [] Yes  Action:  Comments: Patient denies any pain whilst at rest currently. Again notes pain mostly with rotational and bending activities. Objective:  Modalities:   Precautions: Pt has Epilepsy and has weekly seizures.  Pt has a VNS, due to this his voice goes hoarse periodically, Scoliosis, Marfans Syndrome   Exercises:  Exercise Reps/ Time Weight/ Level Comments   SciFit 5min Level 5          Standing      Tband:      Hip ext 15xea Lime    Hip abd 15xea Lime          Tband:      Lats 15x Blue    Rows 15x [employer] press 15x ea Purple    Trunk rotation  15x ea Blue Arms extended         Mat:      Seated lumbar flexion stretch with stability ball 5x 15\"          Supine      LTR 5xea 10\"    Bridge w/band 2x10           Sidelying      Hip Abduction 2x10 Blue    Clamshells 2x10 Blue                      Prone      Prone press-ups 10x     Hip Extension Over 2 Pillows 2x10 Other:      Treatment Charges: Mins Units   []  Modalities     [x]  Ther Exercise 45 3   []  Manual Therapy     []  Ther Activities     []  Aquatics     []  Vasocompression     []  Other     Total Treatment time 45 3       Assessment: [x] Progressing toward goals. Added glute based ex's to improve core and pelvic stabilization. Heavy lumbar paraspinal and QL compensation with hip abduction and extension. Educated to focus on glute activation with these exercises. [] No change. [] Other:  [x] Patient would continue to benefit from skilled physical therapy services in order to: address low back pain and R LE pain, stiffness of the thoracic and lumbar spine, core weakness, postural impairments, and functional impairments including difficulty sleeping at night and difficulty bending forward. STG: (to be met in 8 treatments)  ? Pain: Decrease low back pain to 4/10 on average, pt will also report a reduction in R LE symptoms  ? ROM: Increase lumbar extension to 20°, bilateral lumbar rotation to 25% limited, and R sidebend to [facility]  ? Strength: Increase core strength as demonstrated by progression of DLS exercises; Increase scapular strength as demonstrated by progression of PRE's  ? Function: Improve Oswestry to 42% impairment  Patient to be independent with home exercise program as demonstrated by performance with correct form without cues. LTG: (to be met in 12 treatments)  Pt will be able to return from a bent forward position with little difficulty  Pt will report improvement in sleep to less than one-quarter  Improve Oswestry to 32% impairment      Pt. Education:  [x] Yes  [] No  [x] Reviewed Prior HEP/Ed  Method of Education: [x] Verbal  [x] Demo  [] Written  Comprehension of Education:  [x] Verbalizes understanding. [x] Demonstrates understanding. [] Needs review. [] Demonstrates/verbalizes HEP/Ed previously given. Plan: [x] Continue current frequency toward long and short term goals.     [x] Specific Instructions for subsequent treatments: Low back and thoracic stretches, core strengthening, scapular strengthening, postural exercises       Time In: 1034            Time Out: 1133      Electronically signed by:  Rory Chambers PTA

## 2022-11-04 ENCOUNTER — HOSPITAL ENCOUNTER (OUTPATIENT)
Dept: PHYSICAL THERAPY | Age: 47
Setting detail: THERAPIES SERIES
Discharge: HOME | End: 2022-11-04
Payer: MEDICARE

## 2022-11-04 PROCEDURE — 97110 THERAPEUTIC EXERCISES: CPT

## 2022-11-04 NOTE — FLOWSHEET NOTE
[x] [facility]  [address]  [phone]  F: [phone]     Physical Therapy Daily Treatment Note    Date:  2022  Patient Name:  Jesus Johnson      :  1975    MRN: [id number]  Physician: Raj Sandhu APRN - CNP                             Insurance: [facility] approval was received for Physical Therapy from [employer]/AIM on 10/26/2022. Approval was received for 4 visits, from 10/31/2022 to 2022  Medical Diagnosis: Chronic right-sided low back pain with right-sided sciatica (M54.41,G89.29 [ICD-10-CM]); Scoliosis of lumbosacral spine, unspecified scoliosis type (M41.9 [ICD-10-CM])                          Rehab Codes: M54.59, M25.60, R29.3, M62.81  Onset Date: 6/15/22                 Next 's appt.: 12/15/22    Visit# / total visits: 6/10   Cancels/No Shows: 0/0    Subjective:    Pain:  [x] Yes  [] No Location: Right-side of lower back   Pain Rating: (0-10 scale) 5/10 with specific movements   Pain altered Tx:  [x] No  [] Yes  Action:  Comments: States that he was in [facility] for ten days for his epilepsy and while he was there he is on bed rest other than to get up to go to the restroom. States it really irritated his back. Objective:  Modalities:   Precautions: Pt has Epilepsy and has weekly seizures.  Pt has a VNS, due to this his voice goes hoarse periodically, Scoliosis, Marfans Syndrome   Exercises:  Exercise Reps/ Time Weight/ Level Comments   SciFit 5min Level 5          Standing      Tband:      Hip ext 15xea Lime    Hip abd 15xea Lime          Tband:      Lats 15x2 Blue Added second set on    Rows 15x2 Derl Den Added second set on    Paloff press 15x ea Purple    Trunk rotation  15x ea Blue Arms extended         Mat:      Seated lumbar flexion stretch with stability ball 5x 15\"          Supine      LTR 5xea 10\"    Bridge w/band 2x10           Sidelying      Hip Abduction 2x10 Blue Clamshells 2x10 Blue                      Prone      Prone press-ups 10x     Hip Extension Over 2 Pillows 2x10     Other:      Treatment Charges: Mins Units   []  Modalities     [x]  Ther Exercise 55 4   []  Manual Therapy     []  Ther Activities     []  Aquatics     []  Vasocompression     []  Other     Total Treatment time 55 4       Assessment: [x] Progressing toward goals. Added second set to theraband rows and lats without difficulty. [] No change. [] Other:  [x] Patient would continue to benefit from skilled physical therapy services in order to: address low back pain and R LE pain, stiffness of the thoracic and lumbar spine, core weakness, postural impairments, and functional impairments including difficulty sleeping at night and difficulty bending forward. STG: (to be met in 8 treatments)  ? Pain: Decrease low back pain to 4/10 on average, pt will also report a reduction in R LE symptoms  ? ROM: Increase lumbar extension to 20°, bilateral lumbar rotation to 25% limited, and R sidebend to [facility]  ? Strength: Increase core strength as demonstrated by progression of DLS exercises; Increase scapular strength as demonstrated by progression of PRE's  ? Function: Improve Oswestry to 42% impairment  Patient to be independent with home exercise program as demonstrated by performance with correct form without cues. LTG: (to be met in 12 treatments)  Pt will be able to return from a bent forward position with little difficulty  Pt will report improvement in sleep to less than one-quarter  Improve Oswestry to 32% impairment      Pt. Education:  [x] Yes  [] No  [x] Reviewed Prior HEP/Ed  Method of Education: [x] Verbal  [x] Demo  [] Written  Comprehension of Education:  [x] Verbalizes understanding. [x] Demonstrates understanding. [] Needs review. [] Demonstrates/verbalizes HEP/Ed previously given. Plan: [x] Continue current frequency toward long and short term goals.     [x] Specific Instructions for subsequent treatments: Low back and thoracic stretches, core strengthening, scapular strengthening, postural exercises       Time In: 2:05pm            Time Out: 3:10      Electronically signed by:  Stephanie Levy PT

## 2022-11-09 ENCOUNTER — HOSPITAL ENCOUNTER (OUTPATIENT)
Dept: PHYSICAL THERAPY | Age: 47
Setting detail: THERAPIES SERIES
Discharge: HOME | End: 2022-11-09
Payer: MEDICARE

## 2022-11-09 PROCEDURE — 97110 THERAPEUTIC EXERCISES: CPT

## 2022-11-09 NOTE — FLOWSHEET NOTE
[x] [facility]  [address]  [phone]  F: [phone]     Physical Therapy Daily Treatment Note    Date:  2022  Patient Name:  Mykel Marquez      :  1975    MRN: [id number]  Physician: SHAINA Garcia - ELIZABETH                             Insurance: [facility] approval was received for Physical Therapy from [employer]/AIM on 10/26/2022. Approval was received for 4 visits, from 10/31/2022 to 2022  Medical Diagnosis: Chronic right-sided low back pain with right-sided sciatica (M54.41,G89.29 [ICD-10-CM]); Scoliosis of lumbosacral spine, unspecified scoliosis type (M41.9 [ICD-10-CM])                          Rehab Codes: M54.59, M25.60, R29.3, M62.81  Onset Date: 6/15/22                 Next 's appt.: 12/15/22    Visit# / total visits: 7/10   Cancels/No Shows:     Subjective:    Pain:  [x] Yes  [] No Location: Right-side of lower back   Pain Rating: (0-10 scale) 2-3/10   Pain altered Tx:  [x] No  [] Yes  Action:  Comments: Patient reports low back pain \"is coming along\" but continues to have R low back pain present most of the time. Objective:  Modalities:   Precautions: Pt has Epilepsy and has weekly seizures.  Pt has a VNS, due to this his voice goes hoarse periodically, Scoliosis, Marfans Syndrome   Exercises completed marked with \"X\"  Exercise Reps/ Time Weight/ Level Comments    SciFit 6 min Level 5  X          Standing       Tband:       Hip ext 15xea Lime  X   Hip abd 15xea Lime  X          Tband:       Lats 15x2 Purple Increased band 22 X   Rows 15x2 Heidi Fear Added second set on  X   Paloff press 20x ea Double Purple  X   Trunk rotation  15x ea Blue Arms extended X          Hip Hinge at Wall 2x10 10 lb DB's Butt against wall - slight bend in knees working on hinging at hip to focus on lumbar paraspinals  Added 22 X   Trunk Side Bending  2x10 ea 15 lb DB Added 22 X          Mat: Seated lumbar flexion stretch with stability ball 5x 15\"            Supine       LTR 5xea 10\"     Bridge w/band 2x10             Sidelying       Hip Abduction 2x10 Blue     Clamshells 2x10 Blue                          Prone       Prone press-ups 10x      Hip Extension Over 2 Pillows 2x10  Glute focus - avoid lumbar compensation     Other:      Treatment Charges: Mins Units   []  Modalities     [x]  Ther Exercise 40 3   []  Manual Therapy     []  Ther Activities     []  Aquatics     []  Vasocompression     []  Other     Total Treatment time 40 3       Assessment: [x] Progressing toward goals added hip hinge and standing side bending ex's to progress functional core strength. Increased fatigue noted with today's Tx., so not all ex's were completed as noted. Will continue to progress pt's mobility and strength to reduce his overall LBP. [] No change. [] Other:  [x] Patient would continue to benefit from skilled physical therapy services in order to: address low back pain and R LE pain, stiffness of the thoracic and lumbar spine, core weakness, postural impairments, and functional impairments including difficulty sleeping at night and difficulty bending forward. STG: (to be met in 8 treatments)  ? Pain: Decrease low back pain to 4/10 on average, pt will also report a reduction in R LE symptoms  ? ROM: Increase lumbar extension to 20°, bilateral lumbar rotation to 25% limited, and R sidebend to [facility]  ? Strength: Increase core strength as demonstrated by progression of DLS exercises; Increase scapular strength as demonstrated by progression of PRE's  ? Function: Improve Oswestry to 42% impairment  Patient to be independent with home exercise program as demonstrated by performance with correct form without cues.     LTG: (to be met in 12 treatments)  Pt will be able to return from a bent forward position with little difficulty  Pt will report improvement in sleep to less than one-quarter  Improve Oswestry to 32% impairment      Pt. Education:  [x] Yes  [] No  [x] Reviewed Prior HEP/Ed  Method of Education: [x] Verbal  [x] Demo  [] Written  Comprehension of Education:  [x] Verbalizes understanding. [x] Demonstrates understanding. [] Needs review. [x] Demonstrates/verbalizes HEP/Ed previously given. Plan: [x] Continue current frequency toward long and short term goals.     [x] Specific Instructions for subsequent treatments: Low back and thoracic stretches, core strengthening, scapular strengthening, postural exercises       Time In: 1039            Time Out: 1126      Electronically signed by:  Jodi Laws PTA

## 2022-11-11 ENCOUNTER — HOSPITAL ENCOUNTER (OUTPATIENT)
Dept: PHYSICAL THERAPY | Age: 47
Setting detail: THERAPIES SERIES
Discharge: HOME | End: 2022-11-11
Payer: MEDICARE

## 2022-11-11 NOTE — FLOWSHEET NOTE
[x] [facility] &  Therapy  [address]    [phone]  F: [phone]     Physical Therapy Cancel/No Show note    Date: 2022  Patient: Kevin Rodrigez  : 1975  MRN: [id number]    Cancels/No Shows to date: 3/1    For today's appointment patient:    [x]  Cancelled    [] Rescheduled appointment    [] No-show     Reason given by patient:    []  Patient ill    []  Conflicting appointment    [] No transportation      [] Conflict with work    [] No reason given    [] Weather related    [] COVID-19    [x] Other:      Comments:  Reports his back hurts too much.       [x] Next appointment was confirmed previously     Electronically signed by: Estefany Faria PT

## 2022-11-14 ENCOUNTER — HOSPITAL ENCOUNTER (OUTPATIENT)
Dept: PHYSICAL THERAPY | Age: 47
Setting detail: THERAPIES SERIES
Discharge: HOME | End: 2022-11-14
Payer: MEDICARE

## 2022-11-14 PROCEDURE — 97110 THERAPEUTIC EXERCISES: CPT

## 2022-11-14 NOTE — FLOWSHEET NOTE
[x] [facility]  [address]  [phone]  F: [phone]     Physical Therapy Daily Treatment Note    Date:  2022  Patient Name:  Darya Yancey      :  1975    MRN: [id number]  Physician: SHAINA Hightower CNP                             Insurance: [facility] approval was received for Physical Therapy from [employer]/AIM on 10/26/2022. Approval was received for 4 visits, from 10/31/2022 to 2022  Medical Diagnosis: Chronic right-sided low back pain with right-sided sciatica (M54.41,G89.29 [ICD-10-CM]); Scoliosis of lumbosacral spine, unspecified scoliosis type (M41.9 [ICD-10-CM])                          Rehab Codes: M54.59, M25.60, R29.3, M62.81  Onset Date: 6/15/22                 Next 's appt.: 12/15/22    Visit# / total visits:    Cancels/No Shows:     Subjective:    Pain:  [x] Yes  [] No Location: Right-side of lower back   Pain Rating: (0-10 scale) 2-3/10   Pain altered Tx:  [x] No  [] Yes  Action:  Comments: Patient reports low back pain  is still mostly present with reaching and bending. Objective:  Modalities:   Precautions: Pt has Epilepsy and has weekly seizures.  Pt has a VNS, due to this his voice goes hoarse periodically, Scoliosis, Marfans Syndrome   Exercises completed marked with \"X\"  Exercise Reps/ Time Weight/ Level Comments    SciFit 6 min Level 5  X          Standing       Tband:       - 3 Way Hip 20x ea Blue  X          - Lats 15x2 Patricio Increased band 22 X   - Rows 15x2 Yunior Duos Added second set on  X   - Paloff press 20x ea Double Purple  X   - Trunk rotation  20x ea Purple Arms extended  Increased reps 22 X          Hip Hinge at Wall 2x10 10 lb DB's Butt against wall - slight bend in knees working on hinging at hip to focus on lumbar paraspinals  Added 22 X   Trunk Side Bending  2x10 ea 15 lb DB Added 22 X          Mat:       Seated lumbar flexion stretch with stability ball 5x 15\"            Supine       LTR 5xea 10\"     Bridge w/band 2x10             Sidelying       Hip Abduction 2x10 Blue     Clamshells 2x10 Blue                          Prone       Prone press-ups 10x      Hip Extension Over 2 Pillows 2x10  Glute focus - avoid lumbar compensation     Other:      Treatment Charges: Mins Units   []  Modalities     [x]  Ther Exercise 54 4   []  Manual Therapy     []  Ther Activities     []  Aquatics     []  Vasocompression     []  Other     Total Treatment time 54 4       Assessment: [x] Progressing toward goals patient reports that since last hospital stay - he feels like his symptoms has actually worsened because of not being able to do anything during his stay. Patient would benefit from continued PT to address ongoing scapular, thoracic and low back pain and weakness. [] No change. [] Other:  [x] Patient would continue to benefit from skilled physical therapy services in order to: address low back pain and R LE pain, stiffness of the thoracic and lumbar spine, core weakness, postural impairments, and functional impairments including difficulty sleeping at night and difficulty bending forward. STG: (to be met in 8 treatments) UPDATED 11/14/22  ? Pain: Decrease low back pain to 4/10 on average, pt will also report a reduction in R LE symptoms PROGRESS MADE up to 5/10 with twisting and bending activities on average, but still gets up to 7-8/10 at worst  ? ROM: Increase lumbar extension to 20°, bilateral lumbar rotation to 25% limited, and R sidebend to [address]  ? Strength: Increase core strength as demonstrated by progression of DLS exercises; Increase scapular strength as demonstrated by progression of PRE's PROGRESS MADE - MET for scapular strength   ? Function: Improve Oswestry to 42% impairment MET 34% impaired  Patient to be independent with home exercise program as demonstrated by performance with correct form without cues.  MET     LTG: (to be met in 12 treatments)  Pt will be able to return from a bent forward position with little difficulty  Pt will report improvement in sleep to less than one-quarter  Improve Oswestry to 32% impairment    Patient goals: To move freely, sleep through the night, not think about it all the time. Pt. Education:  [x] Yes  [] No  [x] Reviewed Prior HEP/Ed  Method of Education: [x] Verbal  [x] Demo  [] Written  Comprehension of Education:  [x] Verbalizes understanding. [x] Demonstrates understanding. [] Needs review. [x] Demonstrates/verbalizes HEP/Ed previously given. Plan: [x] Continue current frequency toward long and short term goals.     [x] Specific Instructions for subsequent treatments: Low back and thoracic stretches, core strengthening, scapular strengthening, postural exercises       Time In: 1303            Time Out: 1408      Electronically signed by:  Sera Elkins PTA

## 2022-11-16 ENCOUNTER — HOSPITAL ENCOUNTER (OUTPATIENT)
Dept: PHYSICAL THERAPY | Age: 47
Setting detail: THERAPIES SERIES
Discharge: HOME | End: 2022-11-16
Payer: MEDICARE

## 2022-11-16 PROCEDURE — 97110 THERAPEUTIC EXERCISES: CPT

## 2022-11-16 NOTE — PROGRESS NOTES
[x] [facility] CENTER &  Therapy  [address]  [phone]  F: [phone] [] [address]  [facility] [address]   [address]  P: [phone]  F: [phone] [] [place]  [address]  P: [phone]  F: [phone] [] [address]  P: [phone]  F: [phone] [] [address]  [facility]   [address]   [place]: [phone]  F: [phone]      Physical Therapy Progress Note    Date: 2022      Patient: Nirali Alfaro  : 1975  MRN: [id number]    Physician: SHAINA Murillo CNP                             Insurance: [facility] approval was received for Physical Therapy from [employer] on 10/26/2022. Approval was received for 4 visits, from 10/31/2022 to 2022  Medical Diagnosis: Chronic right-sided low back pain with right-sided sciatica (M54.41,G89.29 [ICD-10-CM]); Scoliosis of lumbosacral spine, unspecified scoliosis type (M41.9 [ICD-10-CM])                          Rehab Codes: M54.59, M25.60, R29.3, M62.81  Onset Date: 6/15/22                               Next 's appt.: 12/15/22     Visit# / total visits:                                   Cancels/No Shows:     Date range of services: 22 to 22      Subjective:  Pain:  [x] Yes  [] No   Location: Right-side of lower back       Pain Rating: (0-10 scale) 2-3/10   Pain altered Tx:  [x] No  [] Yes  Action:  Comments: Patient reports low back pain  is still mostly present with reaching and bending. Objective:  Test Measurements: Lumbar ROM remains limited, especially into extension  Function:  Oswestry= 34% functional impairment    Assessment:  STG: (to be met in 8 treatments)   ?  Pain: Decrease low back pain to 4/10 on average, pt will also report a reduction in R LE symptoms with all ADL's and IADL's PROGRESS MADE up to 5/10 with twisting and bending activities on average, but still gets up to 7-8/10 at worst  ? ROM: Increase lumbar extension to 20°, bilateral lumbar rotation to 25% limited, and R sidebend to [facility] and without pain in order to allow the pt to shower, dress, transfer from one position to another and perform other ADL's/IADL's without limitation-- PROGRESS MADE  ? Strength: Increase core strength as demonstrated by progression of DLS exercises; Increase scapular strength as demonstrated by progression of PRE's in order to improve the pt's ability to reach for objects and lift objects-- PROGRESS MADE - MET for scapular strength   ? Function: Improve Oswestry to 42% impairment MET 34% impaired  Patient to be independent with home exercise program as demonstrated by performance with correct form without cues.  MET      LTG: (to be met in 18 treatments)  Pt will be able to return from a bent forward position with little difficulty to allow the pt to perform daily tasks such as dressing and tying his shoes   Pt will report improvement in sleep to less than one-quarter disturbed at night  Improve Oswestry to 32% functional impairment    Treatment Plan:  [x] Therapeutic Exercise   75203  [] Iontophoresis: 4 mg/mL Dexamethasone Sodium Phosphate  mAmin  70588   [] Therapeutic Activity  45334 [] Vasopneumatic cold with compression  73302    [] Gait Training   12433 [] Ultrasound   49351   [] Neuromuscular Re-education  05314 [] Electrical Stimulation Unattended  58951   [] Manual Therapy  96558 [] Electrical Stimulation Attended  10719   [x] Instruction in HEP  [] Lumbar/Cervical Traction  88242   [] Aquatic Therapy   46803 [] Cold/hotpack    [] Massage   53452      [] Dry Needling, 1 or 2 muscles  44524   [] Biofeedback, first 15 minutes   90912  [] Biofeedback, additional 15 minutes   90913 [] Dry Needling, 3 or more muscles  [id number]       Patient Status:     [x] Continue per initial plan of care. [x] Additional visits necessary in order to continue focusing on core strengthening as the pt demonstrates significant weakness, especially after a recent 10 day hospital admission where the pt was on bedrest for monitoring his seizures. [] Other:     Requested Frequency/Duration: 2 times per week for 12 treatments. Electronically signed by Channing Riggs PT on 11/16/2022 at 3:57 PM      If you have any questions or concerns, please don't hesitate to call. Thank you for your referral.    Physician Signature:________________________________Date:__________________  By signing above or cosigning this note, I have reviewed this plan of care and certify a need for medically necessary rehabilitation services.      *PLEASE SIGN ABOVE AND FAX BACK ALL PAGES*

## 2022-11-16 NOTE — FLOWSHEET NOTE
[x] [facility]  [address]  [phone]  F: [phone]     Physical Therapy Daily Treatment Note    Date:  2022  Patient Name:  So Gillespie      :  1975    MRN: [id number]  Physician: SHAINA Hansen - ELIZABETH                             Insurance: [facility] approval was received for Physical Therapy from [employer] on 10/26/2022. Approval was received for 4 visits, from 10/31/2022 to 2022  Medical Diagnosis: Chronic right-sided low back pain with right-sided sciatica (M54.41,G89.29 [ICD-10-CM]); Scoliosis of lumbosacral spine, unspecified scoliosis type (M41.9 [ICD-10-CM])                          Rehab Codes: M54.59, M25.60, R29.3, M62.81  Onset Date: 6/15/22                 Next 's appt.: 12/15/22    Visit# / total visits:    Cancels/No Shows:     Subjective:    Pain:  [x] Yes  [] No Location: Right-side of lower back   Pain Rating: (0-10 scale) 2-3/10 with movements  Pain altered Tx:  [x] No  [] Yes  Action:  Comments: Doing well overall and tolerating progressions well. No pain at rest this date. Objective:  Modalities:   Precautions: Pt has Epilepsy and has weekly seizures.  Pt has a VNS, due to this his voice goes hoarse periodically, Scoliosis, Marfans Syndrome   Exercises completed marked with \"X\"  Exercise Reps/ Time Weight/ Level Comments    SciFit 6 min Level 5  X          Standing       Hamstring Stretch on Step 3x30\" ea 12 inch            Half Kneeling Wood Chops on Foam 10x ea 4 lb Added 22  R leg leading significantly more difficult X          Cybex:       - Seated Lat Pull Down 2x10 4 plates Added 85/45/53 X   - Close  Row 2x10 6 plates Added 34/55/21 X   - Step up with Contralateral pull down 2x10 ea 1 plate Added 05/15/27 X          Tband:       - 3 Way Hip 20x ea Blue  X          - Lats 15x2 Patricio Increased band 22 X   - Rows 15x2 Ashlyn Sauquoit Added second set on  X   - Paloff press 20x ea Double Purple  X   - Trunk rotation  20x ea Purple Arms extended  Increased reps 11/14/22 X          Hip Hinge at Wall 2x10 10 lb DB's Butt against wall - slight bend in knees working on hinging at hip to focus on lumbar paraspinals  Added 11/9/22 X   Trunk Side Bending  2x10 ea 15 lb DB Added 11/9/22 X          Mat:       Seated lumbar flexion stretch with stability ball 5x 15\"            Supine       LTR 5xea 10\"     Bridge w/band 2x10             Sidelying       Hip Abduction 2x10 Blue     Clamshells 2x10 Blue                          Prone       Prone press-ups 10x      Hip Extension Over 2 Pillows 2x10  Glute focus - avoid lumbar compensation     Other:      Treatment Charges: Mins Units   []  Modalities     [x]  Ther Exercise 56 4   []  Manual Therapy     []  Ther Activities     []  Aquatics     []  Vasocompression     []  Other     Total Treatment time 56 4       Assessment: [x] Progressing toward goals: progressions as noted in chart to focus on posterior obliques and posterior chain strengthening. Educated patient on completed variations kneeling core stability with wood  (weights and bands) and educated on completing posterior oblique exercises as demonstrated in link provided to patient. Patient will benefit from continued PT to address ongoing core weakness and instability. [] No change. [] Other:  [x] Patient would continue to benefit from skilled physical therapy services in order to: address low back pain and R LE pain, stiffness of the thoracic and lumbar spine, core weakness, postural impairments, and functional impairments including difficulty sleeping at night and difficulty bending forward. STG: (to be met in 8 treatments) UPDATED 11/14/22  ?  Pain: Decrease low back pain to 4/10 on average, pt will also report a reduction in R LE symptoms PROGRESS MADE up to 5/10 with twisting and bending activities on average, but still gets up to 7-8/10 at worst  ? ROM: Increase lumbar extension to 20°, bilateral lumbar rotation to 25% limited, and R sidebend to [facility] PROGRESS MADE  ? Strength: Increase core strength as demonstrated by progression of DLS exercises; Increase scapular strength as demonstrated by progression of PRE's PROGRESS MADE - MET for scapular strength   ? Function: Improve Oswestry to 42% impairment MET 34% impaired  Patient to be independent with home exercise program as demonstrated by performance with correct form without cues. MET     LTG: (to be met in 12 treatments)  Pt will be able to return from a bent forward position with little difficulty  Pt will report improvement in sleep to less than one-quarter  Improve Oswestry to 32% impairment    Patient goals: To move freely, sleep through the night, not think about it all the time. Pt. Education:  [x] Yes  [] No  [x] Reviewed Prior HEP/Ed  Method of Education: [x] Verbal  [x] Demo  [x] Written (URL provided as noted below)   Comprehension of Education:  [x] Verbalizes understanding. [x] Demonstrates understanding. [] Needs review. [x] Demonstrates/verbalizes HEP/Ed previously given. 11/16/22: [email or web address]     Plan: [x] Continue current frequency toward long and short term goals.     [x] Specific Instructions for subsequent treatments: SL - work - specifically R LE - SL RDL, heel taps, single box squats,        Time In: 1258            Time Out: 1359      Electronically signed by:  Kush Grewal PTA

## 2022-12-23 NOTE — DISCHARGE SUMMARY
[] [facility]) [facility] CENTER &  Therapy  [address]  [phone]  F: [phone] [] [address]  [facility] [place] [facility]   [address]  P: [phone]  F: [phone] [] [place]  [address] [facility]  P: [phone]  F: [phone] [] [address]  [facility]   [address]   [place]: [phone]  F: [phone]         Physical Therapy Discharge Note    Date: 2022      Patient: Faviola Shin  : 1975  MRN: [id number]    Physician: SHAINA Andrade CNP                             Insurance: [facility] approval was received for Physical Therapy from [employer] on 10/26/2022. Approval was received for 4 visits, from 10/31/2022 to 2022  Medical Diagnosis: Chronic right-sided low back pain with right-sided sciatica (M54.41,G89.29 [ICD-10-CM]); Scoliosis of lumbosacral spine, unspecified scoliosis type (M41.9 [ICD-10-CM])                          Rehab Codes: M54.59, M25.60, R29.3, M62.81  Onset Date: 6/15/22                               Next 's appt.: 12/15/22     Visit# / total visits:                                   Cancels/No Shows:   Date of initial visit: 22                Date of final visit: 22      Subjective:  Refer to re-evaluation on . Objective:  Refer to re-evaluation. Assessment:  Refer to re-evaluation.       Treatment to Date:  [x] Therapeutic Exercise    [] Modalities:  [] Therapeutic Activity    [] Ultrasound  [] Electrical Stimulation  [] Gait Training     [] Massage       [] Lumbar/Cervical Traction  [] Neuromuscular Re-education [] Cold/hotpack [] Iontophoresis: 4 mg/mL  [x] Instruction in Home Exercise Program                     Dexamethasone Sodium  [] Manual Therapy             Phosphate 40-80 mAmin  [] Aquatic Therapy                   [] Vasocompression/    [] Other:             Game Ready    Discharge Status:     [] Pt to continue exercise/home instructions independently. [] Therapy interrupted due to:    [] Pt has 2 or more no shows/cancels, is discontinued per our policy. [x] Other: Pt's insurance company would not approve more visits at this time. Electronically signed by Glenn Everett PT on 12/23/2022 at 12:40 PM      If you have any questions or concerns, please don't hesitate to call.   Thank you for your referral.

## 2023-04-12 PROBLEM — R00.1 BRADYCARDIA: Status: ACTIVE | Noted: 2018-03-13

## 2023-04-12 PROBLEM — G40.909 EPILEPTIC SEIZURE (HCC): Status: ACTIVE | Noted: 2021-03-03

## 2023-04-12 PROBLEM — Q87.40 MARFAN SYNDROME: Status: ACTIVE | Noted: 2021-10-19

## 2023-04-12 PROBLEM — I10 ESSENTIAL (PRIMARY) HYPERTENSION: Status: ACTIVE | Noted: 2017-04-03

## 2023-04-12 PROBLEM — I42.0 DILATED CARDIOMYOPATHY (HCC): Status: ACTIVE | Noted: 2023-04-12

## 2023-04-12 PROBLEM — Z96.89 S/P PLACEMENT OF VNS (VAGUS NERVE STIMULATION) DEVICE: Status: ACTIVE | Noted: 2018-05-21

## 2023-08-14 SDOH — HEALTH STABILITY: PHYSICAL HEALTH: ON AVERAGE, HOW MANY MINUTES DO YOU ENGAGE IN EXERCISE AT THIS LEVEL?: 40 MIN

## 2023-08-14 SDOH — HEALTH STABILITY: PHYSICAL HEALTH: ON AVERAGE, HOW MANY DAYS PER WEEK DO YOU ENGAGE IN MODERATE TO STRENUOUS EXERCISE (LIKE A BRISK WALK)?: 4 DAYS

## 2023-08-14 ASSESSMENT — SOCIAL DETERMINANTS OF HEALTH (SDOH)
WITHIN THE LAST YEAR, HAVE YOU BEEN HUMILIATED OR EMOTIONALLY ABUSED IN OTHER WAYS BY YOUR PARTNER OR EX-PARTNER?: NO
WITHIN THE LAST YEAR, HAVE YOU BEEN AFRAID OF YOUR PARTNER OR EX-PARTNER?: NO
WITHIN THE LAST YEAR, HAVE TO BEEN RAPED OR FORCED TO HAVE ANY KIND OF SEXUAL ACTIVITY BY YOUR PARTNER OR EX-PARTNER?: NO
WITHIN THE LAST YEAR, HAVE YOU BEEN KICKED, HIT, SLAPPED, OR OTHERWISE PHYSICALLY HURT BY YOUR PARTNER OR EX-PARTNER?: NO

## 2023-08-15 ENCOUNTER — OFFICE VISIT (OUTPATIENT)
Dept: ORTHOPEDIC SURGERY | Age: 48
End: 2023-08-15

## 2023-08-15 VITALS — HEIGHT: 77 IN | WEIGHT: 189 LBS | BODY MASS INDEX: 22.32 KG/M2 | RESPIRATION RATE: 14 BRPM

## 2023-08-15 DIAGNOSIS — G89.29 CHRONIC RIGHT-SIDED LOW BACK PAIN WITH RIGHT-SIDED SCIATICA: Primary | ICD-10-CM

## 2023-08-15 DIAGNOSIS — M54.41 CHRONIC RIGHT-SIDED LOW BACK PAIN WITH RIGHT-SIDED SCIATICA: Primary | ICD-10-CM

## 2023-08-15 NOTE — PROGRESS NOTES
Patient ID: Viola Kam is a 53 y.o. male    Chief Compliant:  Chief Complaint   Patient presents with    Lower Back Pain      HPI:  Patient is a 53 y.o. male who presents  with history of seizures, marfan's syndrome, and scoliosis to the clinic today for evaluation of chronic low back pain. Patient reports pain has been ongoing for the past ten years with radicular symptoms predominately on his right side. Describes the pain as sharp in natures. Pain is associated with long periods of sitting and standing. Pain improvement with lumbar extension. Denies weakness, saddle anesthesia, bowel or bladder incontinence, fever or chills. Denies any previous back surgeries. He states that he is on chronic medications for his seizures as well as a vagus nerve stimulator. He mentions that he does have frequent seizures 1-2 weekly with associated falls. He denies any other conservative treatment for his back such as physical therapy, or steroid injections. Review of Systems   Constitutional: Negative for fever, chills, sweats. Eyes: Negative for changes in vision, or pain. HENT: Negative for ear ache, epistaxis, or sore throat. Respiratory/Cardio: Negative for Chest pain, palpitations, SOB, or cough. Gastrointestinal: Negative for abdominal pain, N/V/D. Genitourinary: Negative for dysuria, frequency, urgency, or hematuria. Neurological: Negative for headache, numbness, or weakness. Integumentary: Negative for rash, itching, laceration, or abrasion. Musculoskeletal: Positive for Lower Back Pain     Past History:    Current Outpatient Medications:     sertraline (ZOLOFT) 100 MG tablet, Take 1 tablet by mouth daily, Disp: , Rfl:     ethosuximide (ZARONTIN) 250 MG capsule, Take 1 capsule by mouth 2 times daily, Disp: , Rfl:     amphetamine-dextroamphetamine (ADDERALL) 20 MG tablet, Take 1 tablet by mouth 2 times daily.  Max Daily Amount: 2 tablets, Disp: , Rfl:     cloBAZam (ONFI) 10 MG TABS tablet, take

## 2023-08-18 ENCOUNTER — TELEPHONE (OUTPATIENT)
Dept: ORTHOPEDIC SURGERY | Age: 48
End: 2023-08-18

## 2023-08-18 NOTE — TELEPHONE ENCOUNTER
This a patient of [place], [place]. Called patient and gave the correct office number and transferred.

## 2023-08-22 ENCOUNTER — HOSPITAL ENCOUNTER (OUTPATIENT)
Dept: PHYSICAL THERAPY | Age: 48
Setting detail: THERAPIES SERIES
Discharge: HOME | End: 2023-08-22
Attending: PHYSICIAN ASSISTANT
Payer: MEDICARE

## 2023-08-22 DIAGNOSIS — M25.561 CHRONIC PAIN OF RIGHT KNEE: Primary | ICD-10-CM

## 2023-08-22 DIAGNOSIS — G89.29 CHRONIC PAIN OF RIGHT KNEE: Primary | ICD-10-CM

## 2023-08-22 PROCEDURE — 97161 PT EVAL LOW COMPLEX 20 MIN: CPT

## 2023-08-22 NOTE — FLOWSHEET NOTE
[facility] Risk Assessment    Patient Name:  Vinayak Urrutia  : 1975    Risk Factor Scale  Score   History of Falls [x] Yes  [] No 25  0 25   Secondary Diagnosis [x] Yes  [] No 15  0 15   Ambulatory Aid [] Furniture  [] Crutches/cane/walker  [x] None/bedrest/wheelchair/nurse 30  15  0 0   IV/Heparin Lock [] Yes  [x] No 20  0 0   Gait/Transferring [] Impaired  [] Weak  [x] Normal/bedrest/immobile 20  10  0 0   Mental Status [] Forgets limitations  [x] Oriented to own ability 15  0 0      Total:40     Based on the Assessment score: check the appropriate box.     []  No intervention needed   Low =   Score of 0-24    [x]  Use standard prevention interventions Moderate =  Score of 24-44   [x] Give patient handout and discuss fall prevention strategies   [x] Establish goal of education for patient/family RE: fall prevention strategies    []  Use high risk prevention interventions High = Score of 45 and higher   [] Give patient handout and discuss fall prevention strategies   [] Establish goal of education for patient/family Re: fall prevention strategies   [] Discuss lifeline / other resources    Electronically signed by:   Todd Lyles PT  Date: 2023

## 2023-08-23 ENCOUNTER — OFFICE VISIT (OUTPATIENT)
Dept: ORTHOPEDIC SURGERY | Age: 48
End: 2023-08-23

## 2023-08-23 VITALS — HEIGHT: 77 IN | BODY MASS INDEX: 22.91 KG/M2 | WEIGHT: 194 LBS | OXYGEN SATURATION: 98 % | RESPIRATION RATE: 16 BRPM

## 2023-08-23 DIAGNOSIS — M22.41 CHONDROMALACIA, PATELLA, RIGHT: Primary | ICD-10-CM

## 2023-08-23 RX ORDER — METHYLPREDNISOLONE ACETATE 80 MG/ML
80 INJECTION, SUSPENSION INTRA-ARTICULAR; INTRALESIONAL; INTRAMUSCULAR; SOFT TISSUE ONCE
Status: SHIPPED | OUTPATIENT
Start: 2023-08-23

## 2023-08-23 RX ORDER — LIDOCAINE HYDROCHLORIDE 10 MG/ML
2 INJECTION, SOLUTION INFILTRATION; PERINEURAL ONCE
Status: SHIPPED | OUTPATIENT
Start: 2023-08-23

## 2023-08-23 NOTE — PROGRESS NOTES
[facility] AND SPORTS MEDICINE  [address] [facility]  [address] [address]  Dept: [phone]  Dept Fax: [phone]        Ambulatory Follow Up      Subjective:   Burnis Curling is a 53y.o. year old male who presents to our office today for routine followup regarding his   1. Chondromalacia, patella, right    . Chief Complaint   Patient presents with    Knee Pain     Right Knee pain         HPI Burnis Curling  is a 53 y.o.  male who presents today in follow for right knee patella chondromalacia. The patient has a 10+ year history of pain of the right knee. Patient does have an extensive history of surgery of the right knee starting from when he was in college. He had a right knee scope to clean up the meniscus. He then tore the meniscus and he had it repaired. Within a year he had another scope for meniscectomy. He also had a microfracture surgery for the right patella 10+ years ago. The patient was last seen on 2/7/2022 by Dr. Deny Garcia DO and underwent treatment in the form of cortisone injection into the right knee. Review of Systems   Constitutional:  Positive for activity change. Negative for appetite change, fatigue and fever. Respiratory: Negative. Negative for apnea, cough, chest tightness and shortness of breath. Cardiovascular: Negative. Negative for chest pain, palpitations and leg swelling. Gastrointestinal:  Negative for abdominal distention, abdominal pain, constipation, diarrhea, nausea and vomiting. Genitourinary:  Negative for difficulty urinating, dysuria and hematuria. Musculoskeletal:  Positive for arthralgias, gait problem and joint swelling. Negative for myalgias. Skin:  Negative for color change and rash. Neurological:  Negative for dizziness, weakness, numbness and headaches. Psychiatric/Behavioral:  Negative for sleep disturbance.         Objective :   Resp 16

## 2023-08-23 NOTE — PRE-CERTIFICATION NOTE
.       [x] [address]  [address] [facility]  [phone]  F: [phone] [] [address] [facility]  [address]   [address]  P: [phone]  F: [phone] [] [address]  [address]  P: [phone]  F: [phone] [] [address] [facility]  [address]   [address]   [place]: [phone]  F: [phone]  [] [address] [facility]   Outpatient Occupational Therapy  [address] [facility]: [phone]  F: [phone]          Therapy Pre-certification Note      8/23/2023    Alice Tapia  1975   [id number]      Insurance approval was received for Physical Therapy from [facility] on 08/23/23. Approval was received for 6 visits, from 08/23/23 to 10/21/23. Authorization number [id number]. Patient was contacted to be scheduled and CALLED AND SAID HE WOULD CALL US BACK TO SCHEDULE WHEN ABLE TOO    [id number] APPROVED  [id number] APPROVED    [id number] NOT APPLICABLE.       Electronically signed by Jill Jain on 8/23/2023 at 3:48 PM

## 2023-08-24 ASSESSMENT — ENCOUNTER SYMPTOMS
DIARRHEA: 0
COUGH: 0
RESPIRATORY NEGATIVE: 1
VOMITING: 0
CONSTIPATION: 0
NAUSEA: 0
CHEST TIGHTNESS: 0
APNEA: 0
ABDOMINAL PAIN: 0
SHORTNESS OF BREATH: 0
ABDOMINAL DISTENTION: 0
COLOR CHANGE: 0

## 2024-07-03 ENCOUNTER — OFFICE VISIT (OUTPATIENT)
Dept: PRIMARY CARE CLINIC | Age: 49
End: 2024-07-03
Payer: MEDICARE

## 2024-07-03 VITALS
OXYGEN SATURATION: 97 % | WEIGHT: 212 LBS | HEART RATE: 88 BPM | DIASTOLIC BLOOD PRESSURE: 84 MMHG | BODY MASS INDEX: 25.03 KG/M2 | HEIGHT: 77 IN | SYSTOLIC BLOOD PRESSURE: 122 MMHG

## 2024-07-03 DIAGNOSIS — I42.0 DILATED CARDIOMYOPATHY (HCC): ICD-10-CM

## 2024-07-03 DIAGNOSIS — F33.2 SEVERE EPISODE OF RECURRENT MAJOR DEPRESSIVE DISORDER, WITHOUT PSYCHOTIC FEATURES (HCC): ICD-10-CM

## 2024-07-03 DIAGNOSIS — G40.409 TONIC CLONIC SEIZURES (HCC): Primary | ICD-10-CM

## 2024-07-03 PROBLEM — R29.898 TALL STATURE: Status: ACTIVE | Noted: 2023-11-21

## 2024-07-03 PROBLEM — Q67.6 PECTUS EXCAVATUM: Status: ACTIVE | Noted: 2023-11-21

## 2024-07-03 PROBLEM — I77.810 AORTIC ROOT DILATATION (HCC): Status: ACTIVE | Noted: 2019-06-26

## 2024-07-03 PROBLEM — M41.9 SCOLIOSIS: Status: ACTIVE | Noted: 2023-11-21

## 2024-07-03 PROCEDURE — 3074F SYST BP LT 130 MM HG: CPT | Performed by: NURSE PRACTITIONER

## 2024-07-03 PROCEDURE — 99213 OFFICE O/P EST LOW 20 MIN: CPT | Performed by: NURSE PRACTITIONER

## 2024-07-03 PROCEDURE — 3079F DIAST BP 80-89 MM HG: CPT | Performed by: NURSE PRACTITIONER

## 2024-07-03 PROCEDURE — G8419 CALC BMI OUT NRM PARAM NOF/U: HCPCS | Performed by: NURSE PRACTITIONER

## 2024-07-03 PROCEDURE — G8427 DOCREV CUR MEDS BY ELIG CLIN: HCPCS | Performed by: NURSE PRACTITIONER

## 2024-07-03 PROCEDURE — 1036F TOBACCO NON-USER: CPT | Performed by: NURSE PRACTITIONER

## 2024-07-03 RX ORDER — RIBOFLAVIN (VITAMIN B2) 400 MG
400 TABLET ORAL DAILY
COMMUNITY
Start: 2020-01-02

## 2024-07-03 RX ORDER — BRIVARACETAM 100 MG/1
100 TABLET, FILM COATED ORAL 2 TIMES DAILY
COMMUNITY

## 2024-07-03 SDOH — ECONOMIC STABILITY: FOOD INSECURITY: WITHIN THE PAST 12 MONTHS, YOU WORRIED THAT YOUR FOOD WOULD RUN OUT BEFORE YOU GOT MONEY TO BUY MORE.: OFTEN TRUE

## 2024-07-03 SDOH — ECONOMIC STABILITY: TRANSPORTATION INSECURITY
IN THE PAST 12 MONTHS, HAS LACK OF TRANSPORTATION KEPT YOU FROM MEETINGS, WORK, OR FROM GETTING THINGS NEEDED FOR DAILY LIVING?: YES

## 2024-07-03 SDOH — ECONOMIC STABILITY: INCOME INSECURITY: HOW HARD IS IT FOR YOU TO PAY FOR THE VERY BASICS LIKE FOOD, HOUSING, MEDICAL CARE, AND HEATING?: VERY HARD

## 2024-07-03 SDOH — ECONOMIC STABILITY: FOOD INSECURITY: WITHIN THE PAST 12 MONTHS, THE FOOD YOU BOUGHT JUST DIDN'T LAST AND YOU DIDN'T HAVE MONEY TO GET MORE.: OFTEN TRUE

## 2024-07-03 SDOH — ECONOMIC STABILITY: HOUSING INSECURITY
IN THE LAST 12 MONTHS, WAS THERE A TIME WHEN YOU DID NOT HAVE A STEADY PLACE TO SLEEP OR SLEPT IN A SHELTER (INCLUDING NOW)?: NO

## 2024-07-03 ASSESSMENT — PATIENT HEALTH QUESTIONNAIRE - PHQ9
1. LITTLE INTEREST OR PLEASURE IN DOING THINGS: SEVERAL DAYS
1. LITTLE INTEREST OR PLEASURE IN DOING THINGS: SEVERAL DAYS
9. THOUGHTS THAT YOU WOULD BE BETTER OFF DEAD, OR OF HURTING YOURSELF: NOT AT ALL
5. POOR APPETITE OR OVEREATING: SEVERAL DAYS
6. FEELING BAD ABOUT YOURSELF - OR THAT YOU ARE A FAILURE OR HAVE LET YOURSELF OR YOUR FAMILY DOWN: SEVERAL DAYS
8. MOVING OR SPEAKING SO SLOWLY THAT OTHER PEOPLE COULD HAVE NOTICED. OR THE OPPOSITE, BEING SO FIGETY OR RESTLESS THAT YOU HAVE BEEN MOVING AROUND A LOT MORE THAN USUAL: MORE THAN HALF THE DAYS
10. IF YOU CHECKED OFF ANY PROBLEMS, HOW DIFFICULT HAVE THESE PROBLEMS MADE IT FOR YOU TO DO YOUR WORK, TAKE CARE OF THINGS AT HOME, OR GET ALONG WITH OTHER PEOPLE: VERY DIFFICULT
2. FEELING DOWN, DEPRESSED OR HOPELESS: SEVERAL DAYS
2. FEELING DOWN, DEPRESSED OR HOPELESS: SEVERAL DAYS
9. THOUGHTS THAT YOU WOULD BE BETTER OFF DEAD, OR OF HURTING YOURSELF: NOT AT ALL
6. FEELING BAD ABOUT YOURSELF - OR THAT YOU ARE A FAILURE OR HAVE LET YOURSELF OR YOUR FAMILY DOWN: SEVERAL DAYS
3. TROUBLE FALLING OR STAYING ASLEEP: SEVERAL DAYS
10. IF YOU CHECKED OFF ANY PROBLEMS, HOW DIFFICULT HAVE THESE PROBLEMS MADE IT FOR YOU TO DO YOUR WORK, TAKE CARE OF THINGS AT HOME, OR GET ALONG WITH OTHER PEOPLE: VERY DIFFICULT
3. TROUBLE FALLING OR STAYING ASLEEP: SEVERAL DAYS
8. MOVING OR SPEAKING SO SLOWLY THAT OTHER PEOPLE COULD HAVE NOTICED. OR THE OPPOSITE - BEING SO FIDGETY OR RESTLESS THAT YOU HAVE BEEN MOVING AROUND A LOT MORE THAN USUAL: MORE THAN HALF THE DAYS
7. TROUBLE CONCENTRATING ON THINGS, SUCH AS READING THE NEWSPAPER OR WATCHING TELEVISION: NEARLY EVERY DAY
7. TROUBLE CONCENTRATING ON THINGS, SUCH AS READING THE NEWSPAPER OR WATCHING TELEVISION: NEARLY EVERY DAY
SUM OF ALL RESPONSES TO PHQ QUESTIONS 1-9: 12
4. FEELING TIRED OR HAVING LITTLE ENERGY: MORE THAN HALF THE DAYS
SUM OF ALL RESPONSES TO PHQ QUESTIONS 1-9: 12
SUM OF ALL RESPONSES TO PHQ9 QUESTIONS 1 & 2: 2
SUM OF ALL RESPONSES TO PHQ QUESTIONS 1-9: 12
5. POOR APPETITE OR OVEREATING: SEVERAL DAYS
4. FEELING TIRED OR HAVING LITTLE ENERGY: MORE THAN HALF THE DAYS

## 2024-07-03 NOTE — PATIENT INSTRUCTIONS
Shelter, [facility] Social teachings  Phone number: [phone]    [employer]    [employer]  What they offer: Financial, employment, training, food, housing, other needs  Phone number: [phone]    Medications/Medical  [facility] Financial Assistance  What they offer: Assistance with [place] bills  Phone: [phone], option 6 OR  [phone]  [facility]     What they offer: Connections to health education classes, free health screenings, physical activity programs, and community and social resources.  Phone Number: [phone]  [facility] Healthy Connections Programs   What they offer:   Financial Opportunity Center (065-182-9681)  [employer] Chronic Disease Management Program (205-342-2693)  Mother and Child Dependency Program (547-067-6069)  Help Me Grow (087-091-7925)   Early Head Start Program (988-629-5151)  Breast and Cervical Cancer Project (732-531-0930)  [facility] [place] [facility] Program  What they offer: Free dental cleanings for ages 55+ in partnership with [facility]; located in [facility].  Phone Number: [phone]    Fact - Cancer Services [facility]  What they offer: Financial Assistance for Cancer Treatment  Phone number: 620.676.9891    Good Rx:  What they offer: Good Rx provides free prescription coupons for discounts on medications.  Website: https://www.[employer].ONOFFMIX (?????)  [employer]edy[employer]eds:  What they offer: Needy[employer]eds offers free information on medications and healthcare cost savings programs including prescription assistance programs, coupons, and discount programs.  Helpline: 357.868.5376  Website: https://www.needCureeoeds.org  RX Assist:  What they offer: Information about free and low-cost medicine programs.  Website: https://www.rxassist.org  [employer] $4 Prescription Program:  What they offer: Prescription Program includes up to a 30-day supply for $4 and a 90-day supply for $10 of some covered generic drugs at commonly prescribed

## 2024-07-03 NOTE — PROGRESS NOTES
what it used to be so it is a lot of sorting organization for him.  He believes this is an acute issue and does not require any medication changes at this time    Seizures        Review of Systems   Neurological:  Positive for seizures.          Objective     DIAGNOSTIC FINDINGS:  CBC:  Lab Results   Component Value Date/Time    WBC 4.5 12/23/2020 03:16 PM    HGB 14.0 12/23/2020 03:16 PM     12/23/2020 03:16 PM     05/25/2012 08:34 PM       BMP:    Lab Results   Component Value Date/Time     12/23/2020 03:16 PM    K 4.3 12/23/2020 03:16 PM     12/23/2020 03:16 PM    CO2 23 12/23/2020 03:16 PM    BUN 12 12/23/2020 03:16 PM    CREATININE 0.89 12/23/2020 03:16 PM    GLUCOSE 99 12/23/2020 03:16 PM    GLUCOSE 85 05/25/2012 08:34 PM       HEMOGLOBIN A1C: No results found for: \"LABA1C\"    FASTING LIPID PANEL:  Lab Results   Component Value Date    CHOL 177 06/15/2017    HDL 68 06/15/2017    TRIG 59 06/15/2017       Physical Exam  Vitals and nursing note reviewed.   Constitutional:       Appearance: Normal appearance.   HENT:      Head: Normocephalic and atraumatic.   Eyes:      Extraocular Movements: Extraocular movements intact.   Cardiovascular:      Rate and Rhythm: Normal rate and regular rhythm.      Pulses: Normal pulses.      Heart sounds: Normal heart sounds.   Pulmonary:      Effort: Pulmonary effort is normal.      Breath sounds: Normal breath sounds.   Chest:      Comments: Pectus excavatum  Abdominal:      General: Abdomen is flat.      Palpations: Abdomen is soft.   Musculoskeletal:         General: Normal range of motion.      Cervical back: Normal range of motion and neck supple.   Skin:     General: Skin is warm.   Neurological:      General: No focal deficit present.      Mental Status: He is alert and oriented to person, place, and time.            An electronic signature was used to authenticate this note.    --SHAINA Lazo - CNP

## 2024-08-18 ENCOUNTER — APPOINTMENT (OUTPATIENT)
Dept: CT IMAGING | Age: 49
DRG: 398 | End: 2024-08-18
Payer: MEDICARE

## 2024-08-18 ENCOUNTER — ANESTHESIA (OUTPATIENT)
Dept: OPERATING ROOM | Age: 49
End: 2024-08-18
Payer: MEDICARE

## 2024-08-18 ENCOUNTER — ANESTHESIA EVENT (OUTPATIENT)
Dept: OPERATING ROOM | Age: 49
End: 2024-08-18
Payer: MEDICARE

## 2024-08-18 ENCOUNTER — HOSPITAL ENCOUNTER (INPATIENT)
Age: 49
LOS: 1 days | Discharge: HOME | DRG: 398 | End: 2024-08-19
Attending: EMERGENCY MEDICINE | Admitting: SURGERY
Payer: MEDICARE

## 2024-08-18 DIAGNOSIS — K37 APPENDICITIS, UNSPECIFIED APPENDICITIS TYPE: ICD-10-CM

## 2024-08-18 DIAGNOSIS — K35.200 ACUTE APPENDICITIS WITH GENERALIZED PERITONITIS WITHOUT GANGRENE, PERFORATION, OR ABSCESS: Primary | ICD-10-CM

## 2024-08-18 PROBLEM — K35.80 ACUTE APPENDICITIS: Status: ACTIVE | Noted: 2024-08-18

## 2024-08-18 LAB
ALBUMIN SERPL-MCNC: 4.9 G/DL (ref 3.5–5.2)
ALBUMIN/GLOB SERPL: 2 {RATIO} (ref 1–2.5)
ALP SERPL-CCNC: 87 U/L (ref 40–129)
ALT SERPL-CCNC: 32 U/L (ref 10–50)
ANION GAP SERPL CALCULATED.3IONS-SCNC: 9 MMOL/L (ref 9–16)
AST SERPL-CCNC: 27 U/L (ref 10–50)
BACTERIA URNS QL MICRO: NORMAL
BASOPHILS # BLD: 0.07 K/UL (ref 0–0.2)
BASOPHILS NFR BLD: 1 % (ref 0–2)
BILIRUB SERPL-MCNC: 0.5 MG/DL (ref 0–1.2)
BILIRUB UR QL STRIP: NEGATIVE
BUN SERPL-MCNC: 14 MG/DL (ref 6–20)
CALCIUM SERPL-MCNC: 9.9 MG/DL (ref 8.6–10.4)
CASTS #/AREA URNS LPF: NORMAL /LPF (ref 0–8)
CHLORIDE SERPL-SCNC: 105 MMOL/L (ref 98–107)
CLARITY UR: ABNORMAL
CO2 SERPL-SCNC: 23 MMOL/L (ref 20–31)
COLOR UR: YELLOW
CREAT SERPL-MCNC: 1 MG/DL (ref 0.7–1.2)
EOSINOPHIL # BLD: 0.04 K/UL (ref 0–0.44)
EOSINOPHILS RELATIVE PERCENT: 0 % (ref 1–4)
EPI CELLS #/AREA URNS HPF: NORMAL /HPF (ref 0–5)
ERYTHROCYTE [DISTWIDTH] IN BLOOD BY AUTOMATED COUNT: 12.8 % (ref 11.8–14.4)
GFR, ESTIMATED: >90 ML/MIN/1.73M2
GLUCOSE SERPL-MCNC: 123 MG/DL (ref 74–99)
GLUCOSE UR STRIP-MCNC: NEGATIVE MG/DL
HCT VFR BLD AUTO: 48 % (ref 40.7–50.3)
HGB BLD-MCNC: 15.8 G/DL (ref 13–17)
HGB UR QL STRIP.AUTO: NEGATIVE
IMM GRANULOCYTES # BLD AUTO: 0.07 K/UL (ref 0–0.3)
IMM GRANULOCYTES NFR BLD: 1 %
KETONES UR STRIP-MCNC: NEGATIVE MG/DL
LEUKOCYTE ESTERASE UR QL STRIP: NEGATIVE
LIPASE SERPL-CCNC: 42 U/L (ref 13–60)
LYMPHOCYTES NFR BLD: 1.37 K/UL (ref 1.1–3.7)
LYMPHOCYTES RELATIVE PERCENT: 9 % (ref 24–43)
MCH RBC QN AUTO: 30.2 PG (ref 25.2–33.5)
MCHC RBC AUTO-ENTMCNC: 32.9 G/DL (ref 28.4–34.8)
MCV RBC AUTO: 91.6 FL (ref 82.6–102.9)
MONOCYTES NFR BLD: 1.46 K/UL (ref 0.1–1.2)
MONOCYTES NFR BLD: 10 % (ref 3–12)
NEUTROPHILS NFR BLD: 79 % (ref 36–65)
NEUTS SEG NFR BLD: 11.7 K/UL (ref 1.5–8.1)
NITRITE UR QL STRIP: NEGATIVE
NRBC BLD-RTO: 0 PER 100 WBC
PH UR STRIP: 7 [PH] (ref 5–8)
PLATELET # BLD AUTO: 268 K/UL (ref 138–453)
PMV BLD AUTO: 9.8 FL (ref 8.1–13.5)
POTASSIUM SERPL-SCNC: 3.8 MMOL/L (ref 3.7–5.3)
PROT SERPL-MCNC: 6.9 G/DL (ref 6.6–8.7)
PROT UR STRIP-MCNC: NEGATIVE MG/DL
RBC # BLD AUTO: 5.24 M/UL (ref 4.21–5.77)
RBC #/AREA URNS HPF: NORMAL /HPF (ref 0–4)
SODIUM SERPL-SCNC: 137 MMOL/L (ref 136–145)
SP GR UR STRIP: 1.02 (ref 1–1.03)
UROBILINOGEN UR STRIP-ACNC: NORMAL EU/DL (ref 0–1)
WBC #/AREA URNS HPF: NORMAL /HPF (ref 0–5)
WBC OTHER # BLD: 14.7 K/UL (ref 3.5–11.3)

## 2024-08-18 PROCEDURE — 3600000014 HC SURGERY LEVEL 4 ADDTL 15MIN: Performed by: SURGERY

## 2024-08-18 PROCEDURE — 2580000003 HC RX 258

## 2024-08-18 PROCEDURE — 6360000002 HC RX W HCPCS

## 2024-08-18 PROCEDURE — 81001 URINALYSIS AUTO W/SCOPE: CPT

## 2024-08-18 PROCEDURE — G0378 HOSPITAL OBSERVATION PER HR: HCPCS

## 2024-08-18 PROCEDURE — 2720000010 HC SURG SUPPLY STERILE: Performed by: SURGERY

## 2024-08-18 PROCEDURE — 3600000004 HC SURGERY LEVEL 4 BASE: Performed by: SURGERY

## 2024-08-18 PROCEDURE — 0DTJ4ZZ RESECTION OF APPENDIX, PERCUTANEOUS ENDOSCOPIC APPROACH: ICD-10-PCS | Performed by: SURGERY

## 2024-08-18 PROCEDURE — 2500000003 HC RX 250 WO HCPCS: Performed by: SURGERY

## 2024-08-18 PROCEDURE — 99222 1ST HOSP IP/OBS MODERATE 55: CPT | Performed by: SURGERY

## 2024-08-18 PROCEDURE — 6370000000 HC RX 637 (ALT 250 FOR IP)

## 2024-08-18 PROCEDURE — 3700000000 HC ANESTHESIA ATTENDED CARE: Performed by: SURGERY

## 2024-08-18 PROCEDURE — 99285 EMERGENCY DEPT VISIT HI MDM: CPT

## 2024-08-18 PROCEDURE — 96374 THER/PROPH/DIAG INJ IV PUSH: CPT

## 2024-08-18 PROCEDURE — 74177 CT ABD & PELVIS W/CONTRAST: CPT

## 2024-08-18 PROCEDURE — 1200000000 HC SEMI PRIVATE

## 2024-08-18 PROCEDURE — 88304 TISSUE EXAM BY PATHOLOGIST: CPT

## 2024-08-18 PROCEDURE — 44970 LAPAROSCOPY APPENDECTOMY: CPT | Performed by: SURGERY

## 2024-08-18 PROCEDURE — 2709999900 HC NON-CHARGEABLE SUPPLY: Performed by: SURGERY

## 2024-08-18 PROCEDURE — 3700000001 HC ADD 15 MINUTES (ANESTHESIA): Performed by: SURGERY

## 2024-08-18 PROCEDURE — 80053 COMPREHEN METABOLIC PANEL: CPT

## 2024-08-18 PROCEDURE — 93005 ELECTROCARDIOGRAM TRACING: CPT

## 2024-08-18 PROCEDURE — 6360000004 HC RX CONTRAST MEDICATION

## 2024-08-18 PROCEDURE — 2500000003 HC RX 250 WO HCPCS

## 2024-08-18 PROCEDURE — 85025 COMPLETE CBC W/AUTO DIFF WBC: CPT

## 2024-08-18 PROCEDURE — 96376 TX/PRO/DX INJ SAME DRUG ADON: CPT

## 2024-08-18 PROCEDURE — 96375 TX/PRO/DX INJ NEW DRUG ADDON: CPT

## 2024-08-18 PROCEDURE — 7100000001 HC PACU RECOVERY - ADDTL 15 MIN: Performed by: SURGERY

## 2024-08-18 PROCEDURE — 2580000003 HC RX 258: Performed by: SURGERY

## 2024-08-18 PROCEDURE — 83690 ASSAY OF LIPASE: CPT

## 2024-08-18 PROCEDURE — 7100000000 HC PACU RECOVERY - FIRST 15 MIN: Performed by: SURGERY

## 2024-08-18 RX ORDER — POTASSIUM CHLORIDE 7.45 MG/ML
10 INJECTION INTRAVENOUS PRN
Status: CANCELLED | OUTPATIENT
Start: 2024-08-18

## 2024-08-18 RX ORDER — SODIUM CHLORIDE, SODIUM LACTATE, POTASSIUM CHLORIDE, AND CALCIUM CHLORIDE .6; .31; .03; .02 G/100ML; G/100ML; G/100ML; G/100ML
1000 INJECTION, SOLUTION INTRAVENOUS ONCE
Status: COMPLETED | OUTPATIENT
Start: 2024-08-18 | End: 2024-08-18

## 2024-08-18 RX ORDER — ETHOSUXIMIDE 250 MG/1
250 CAPSULE ORAL 2 TIMES DAILY
Status: DISCONTINUED | OUTPATIENT
Start: 2024-08-18 | End: 2024-08-19 | Stop reason: HOSPADM

## 2024-08-18 RX ORDER — SODIUM CHLORIDE 0.9 % (FLUSH) 0.9 %
5-40 SYRINGE (ML) INJECTION PRN
Status: DISCONTINUED | OUTPATIENT
Start: 2024-08-18 | End: 2024-08-19 | Stop reason: HOSPADM

## 2024-08-18 RX ORDER — PROPOFOL 10 MG/ML
INJECTION, EMULSION INTRAVENOUS PRN
Status: DISCONTINUED | OUTPATIENT
Start: 2024-08-18 | End: 2024-08-18 | Stop reason: SDUPTHER

## 2024-08-18 RX ORDER — POLYETHYLENE GLYCOL 3350 17 G/17G
17 POWDER, FOR SOLUTION ORAL DAILY PRN
Status: CANCELLED | OUTPATIENT
Start: 2024-08-18

## 2024-08-18 RX ORDER — SUMATRIPTAN 50 MG/1
100 TABLET, FILM COATED ORAL
Status: DISCONTINUED | OUTPATIENT
Start: 2024-08-18 | End: 2024-08-19 | Stop reason: HOSPADM

## 2024-08-18 RX ORDER — OXYCODONE HYDROCHLORIDE 5 MG/1
5 TABLET ORAL EVERY 6 HOURS PRN
Status: DISCONTINUED | OUTPATIENT
Start: 2024-08-18 | End: 2024-08-19 | Stop reason: HOSPADM

## 2024-08-18 RX ORDER — SODIUM CHLORIDE 9 MG/ML
INJECTION, SOLUTION INTRAVENOUS PRN
Status: DISCONTINUED | OUTPATIENT
Start: 2024-08-18 | End: 2024-08-19 | Stop reason: HOSPADM

## 2024-08-18 RX ORDER — ONDANSETRON 4 MG/1
4 TABLET, ORALLY DISINTEGRATING ORAL EVERY 8 HOURS PRN
Status: CANCELLED | OUTPATIENT
Start: 2024-08-18

## 2024-08-18 RX ORDER — SODIUM CHLORIDE 9 MG/ML
INJECTION, SOLUTION INTRAVENOUS CONTINUOUS PRN
Status: DISCONTINUED | OUTPATIENT
Start: 2024-08-18 | End: 2024-08-18 | Stop reason: SDUPTHER

## 2024-08-18 RX ORDER — ONDANSETRON 2 MG/ML
4 INJECTION INTRAMUSCULAR; INTRAVENOUS EVERY 6 HOURS PRN
Status: CANCELLED | OUTPATIENT
Start: 2024-08-18

## 2024-08-18 RX ORDER — SODIUM CHLORIDE 9 MG/ML
INJECTION, SOLUTION INTRAVENOUS PRN
Status: DISCONTINUED | OUTPATIENT
Start: 2024-08-18 | End: 2024-08-18

## 2024-08-18 RX ORDER — ONDANSETRON 2 MG/ML
4 INJECTION INTRAMUSCULAR; INTRAVENOUS
Status: DISCONTINUED | OUTPATIENT
Start: 2024-08-18 | End: 2024-08-18

## 2024-08-18 RX ORDER — BUPIVACAINE HYDROCHLORIDE AND EPINEPHRINE 5; 5 MG/ML; UG/ML
INJECTION, SOLUTION PERINEURAL PRN
Status: DISCONTINUED | OUTPATIENT
Start: 2024-08-18 | End: 2024-08-18 | Stop reason: ALTCHOICE

## 2024-08-18 RX ORDER — NALOXONE HYDROCHLORIDE 0.4 MG/ML
INJECTION, SOLUTION INTRAMUSCULAR; INTRAVENOUS; SUBCUTANEOUS PRN
Status: DISCONTINUED | OUTPATIENT
Start: 2024-08-18 | End: 2024-08-18

## 2024-08-18 RX ORDER — SODIUM CHLORIDE, SODIUM LACTATE, POTASSIUM CHLORIDE, CALCIUM CHLORIDE 600; 310; 30; 20 MG/100ML; MG/100ML; MG/100ML; MG/100ML
INJECTION, SOLUTION INTRAVENOUS CONTINUOUS PRN
Status: DISCONTINUED | OUTPATIENT
Start: 2024-08-18 | End: 2024-08-18

## 2024-08-18 RX ORDER — ONDANSETRON 2 MG/ML
4 INJECTION INTRAMUSCULAR; INTRAVENOUS ONCE
Status: COMPLETED | OUTPATIENT
Start: 2024-08-18 | End: 2024-08-18

## 2024-08-18 RX ORDER — ACETAMINOPHEN 325 MG/1
650 TABLET ORAL EVERY 6 HOURS PRN
Status: CANCELLED | OUTPATIENT
Start: 2024-08-18

## 2024-08-18 RX ORDER — ENOXAPARIN SODIUM 100 MG/ML
40 INJECTION SUBCUTANEOUS DAILY
Status: CANCELLED | OUTPATIENT
Start: 2024-08-18

## 2024-08-18 RX ORDER — MORPHINE SULFATE 4 MG/ML
2 INJECTION INTRAVENOUS ONCE
Status: COMPLETED | OUTPATIENT
Start: 2024-08-18 | End: 2024-08-18

## 2024-08-18 RX ORDER — MAGNESIUM SULFATE IN WATER 40 MG/ML
2000 INJECTION, SOLUTION INTRAVENOUS PRN
Status: CANCELLED | OUTPATIENT
Start: 2024-08-18

## 2024-08-18 RX ORDER — ONDANSETRON 2 MG/ML
INJECTION INTRAMUSCULAR; INTRAVENOUS PRN
Status: DISCONTINUED | OUTPATIENT
Start: 2024-08-18 | End: 2024-08-18 | Stop reason: SDUPTHER

## 2024-08-18 RX ORDER — POTASSIUM CHLORIDE 1500 MG/1
40 TABLET, EXTENDED RELEASE ORAL PRN
Status: CANCELLED | OUTPATIENT
Start: 2024-08-18

## 2024-08-18 RX ORDER — SUMATRIPTAN 50 MG/1
100 TABLET, FILM COATED ORAL ONCE
Status: DISCONTINUED | OUTPATIENT
Start: 2024-08-18 | End: 2024-08-18

## 2024-08-18 RX ORDER — ACETAMINOPHEN 650 MG/1
650 SUPPOSITORY RECTAL EVERY 6 HOURS PRN
Status: CANCELLED | OUTPATIENT
Start: 2024-08-18

## 2024-08-18 RX ORDER — ONDANSETRON 4 MG/1
4 TABLET, ORALLY DISINTEGRATING ORAL EVERY 8 HOURS PRN
Status: DISCONTINUED | OUTPATIENT
Start: 2024-08-18 | End: 2024-08-19 | Stop reason: HOSPADM

## 2024-08-18 RX ORDER — FENTANYL CITRATE 50 UG/ML
INJECTION, SOLUTION INTRAMUSCULAR; INTRAVENOUS PRN
Status: DISCONTINUED | OUTPATIENT
Start: 2024-08-18 | End: 2024-08-18 | Stop reason: SDUPTHER

## 2024-08-18 RX ORDER — MAGNESIUM HYDROXIDE 1200 MG/15ML
LIQUID ORAL CONTINUOUS PRN
Status: DISCONTINUED | OUTPATIENT
Start: 2024-08-18 | End: 2024-08-18 | Stop reason: HOSPADM

## 2024-08-18 RX ORDER — ROCURONIUM BROMIDE 10 MG/ML
INJECTION, SOLUTION INTRAVENOUS PRN
Status: DISCONTINUED | OUTPATIENT
Start: 2024-08-18 | End: 2024-08-18 | Stop reason: SDUPTHER

## 2024-08-18 RX ORDER — ACETAMINOPHEN 325 MG/1
650 TABLET ORAL EVERY 4 HOURS PRN
Status: DISCONTINUED | OUTPATIENT
Start: 2024-08-18 | End: 2024-08-19

## 2024-08-18 RX ORDER — SODIUM CHLORIDE 0.9 % (FLUSH) 0.9 %
5-40 SYRINGE (ML) INJECTION PRN
Status: CANCELLED | OUTPATIENT
Start: 2024-08-18

## 2024-08-18 RX ORDER — KETOROLAC TROMETHAMINE 30 MG/ML
15 INJECTION, SOLUTION INTRAMUSCULAR; INTRAVENOUS EVERY 6 HOURS
Status: COMPLETED | OUTPATIENT
Start: 2024-08-18 | End: 2024-08-19

## 2024-08-18 RX ORDER — SODIUM CHLORIDE 0.9 % (FLUSH) 0.9 %
5-40 SYRINGE (ML) INJECTION PRN
Status: DISCONTINUED | OUTPATIENT
Start: 2024-08-18 | End: 2024-08-18

## 2024-08-18 RX ORDER — DEXAMETHASONE SODIUM PHOSPHATE 10 MG/ML
INJECTION, SOLUTION INTRAMUSCULAR; INTRAVENOUS PRN
Status: DISCONTINUED | OUTPATIENT
Start: 2024-08-18 | End: 2024-08-18 | Stop reason: SDUPTHER

## 2024-08-18 RX ORDER — SUCCINYLCHOLINE/SOD CL,ISO/PF 100 MG/5ML
SYRINGE (ML) INTRAVENOUS PRN
Status: DISCONTINUED | OUTPATIENT
Start: 2024-08-18 | End: 2024-08-18 | Stop reason: SDUPTHER

## 2024-08-18 RX ORDER — ACETAMINOPHEN 500 MG
1000 TABLET ORAL EVERY 8 HOURS SCHEDULED
Status: DISCONTINUED | OUTPATIENT
Start: 2024-08-18 | End: 2024-08-19 | Stop reason: HOSPADM

## 2024-08-18 RX ORDER — RIBOFLAVIN (VITAMIN B2) 100 MG
400 TABLET ORAL DAILY
Status: DISCONTINUED | OUTPATIENT
Start: 2024-08-19 | End: 2024-08-19 | Stop reason: HOSPADM

## 2024-08-18 RX ORDER — HYDRALAZINE HYDROCHLORIDE 20 MG/ML
10 INJECTION INTRAMUSCULAR; INTRAVENOUS
Status: DISCONTINUED | OUTPATIENT
Start: 2024-08-18 | End: 2024-08-18

## 2024-08-18 RX ORDER — SODIUM CHLORIDE 0.9 % (FLUSH) 0.9 %
5-40 SYRINGE (ML) INJECTION EVERY 12 HOURS SCHEDULED
Status: DISCONTINUED | OUTPATIENT
Start: 2024-08-18 | End: 2024-08-18

## 2024-08-18 RX ORDER — IOPAMIDOL 755 MG/ML
75 INJECTION, SOLUTION INTRAVASCULAR
Status: COMPLETED | OUTPATIENT
Start: 2024-08-18 | End: 2024-08-18

## 2024-08-18 RX ORDER — CLOBAZAM 10 MG/1
10 TABLET ORAL 2 TIMES DAILY
Status: DISCONTINUED | OUTPATIENT
Start: 2024-08-18 | End: 2024-08-19 | Stop reason: HOSPADM

## 2024-08-18 RX ORDER — SODIUM CHLORIDE 9 MG/ML
INJECTION, SOLUTION INTRAVENOUS PRN
Status: CANCELLED | OUTPATIENT
Start: 2024-08-18

## 2024-08-18 RX ORDER — SODIUM CHLORIDE 0.9 % (FLUSH) 0.9 %
5-40 SYRINGE (ML) INJECTION EVERY 12 HOURS SCHEDULED
Status: CANCELLED | OUTPATIENT
Start: 2024-08-18

## 2024-08-18 RX ORDER — ONDANSETRON 2 MG/ML
4 INJECTION INTRAMUSCULAR; INTRAVENOUS EVERY 6 HOURS PRN
Status: DISCONTINUED | OUTPATIENT
Start: 2024-08-18 | End: 2024-08-19 | Stop reason: HOSPADM

## 2024-08-18 RX ORDER — LABETALOL HYDROCHLORIDE 5 MG/ML
10 INJECTION, SOLUTION INTRAVENOUS
Status: DISCONTINUED | OUTPATIENT
Start: 2024-08-18 | End: 2024-08-18

## 2024-08-18 RX ORDER — LIDOCAINE HYDROCHLORIDE 10 MG/ML
INJECTION, SOLUTION EPIDURAL; INFILTRATION; INTRACAUDAL; PERINEURAL PRN
Status: DISCONTINUED | OUTPATIENT
Start: 2024-08-18 | End: 2024-08-18 | Stop reason: SDUPTHER

## 2024-08-18 RX ORDER — SODIUM CHLORIDE 0.9 % (FLUSH) 0.9 %
5-40 SYRINGE (ML) INJECTION EVERY 12 HOURS SCHEDULED
Status: DISCONTINUED | OUTPATIENT
Start: 2024-08-18 | End: 2024-08-19 | Stop reason: HOSPADM

## 2024-08-18 RX ADMIN — SUGAMMADEX 200 MG: 100 INJECTION, SOLUTION INTRAVENOUS at 19:59

## 2024-08-18 RX ADMIN — PHENYLEPHRINE HYDROCHLORIDE 100 MCG: 10 INJECTION INTRAVENOUS at 19:28

## 2024-08-18 RX ADMIN — MORPHINE SULFATE 2 MG: 4 INJECTION INTRAVENOUS at 12:40

## 2024-08-18 RX ADMIN — CLOBAZAM 10 MG: 10 TABLET ORAL at 22:57

## 2024-08-18 RX ADMIN — PIPERACILLIN AND TAZOBACTAM 3375 MG: 3; .375 INJECTION, POWDER, LYOPHILIZED, FOR SOLUTION INTRAVENOUS at 23:13

## 2024-08-18 RX ADMIN — BRIVARACETAM 100 MG: 50 TABLET, FILM COATED ORAL at 22:58

## 2024-08-18 RX ADMIN — SODIUM CHLORIDE, POTASSIUM CHLORIDE, SODIUM LACTATE AND CALCIUM CHLORIDE 1000 ML: 600; 310; 30; 20 INJECTION, SOLUTION INTRAVENOUS at 17:35

## 2024-08-18 RX ADMIN — Medication 100 MG: at 19:14

## 2024-08-18 RX ADMIN — FENTANYL CITRATE 50 MCG: 50 INJECTION, SOLUTION INTRAMUSCULAR; INTRAVENOUS at 20:07

## 2024-08-18 RX ADMIN — SERTRALINE HYDROCHLORIDE 100 MG: 50 TABLET ORAL at 22:57

## 2024-08-18 RX ADMIN — SODIUM CHLORIDE: 9 INJECTION, SOLUTION INTRAVENOUS at 19:05

## 2024-08-18 RX ADMIN — FENTANYL CITRATE 25 MCG: 50 INJECTION, SOLUTION INTRAMUSCULAR; INTRAVENOUS at 20:09

## 2024-08-18 RX ADMIN — ONDANSETRON 4 MG: 2 INJECTION INTRAMUSCULAR; INTRAVENOUS at 14:49

## 2024-08-18 RX ADMIN — FENTANYL CITRATE 100 MCG: 50 INJECTION, SOLUTION INTRAMUSCULAR; INTRAVENOUS at 19:13

## 2024-08-18 RX ADMIN — IOPAMIDOL 75 ML: 755 INJECTION, SOLUTION INTRAVENOUS at 13:15

## 2024-08-18 RX ADMIN — KETOROLAC TROMETHAMINE 15 MG: 30 INJECTION, SOLUTION INTRAMUSCULAR; INTRAVENOUS at 21:10

## 2024-08-18 RX ADMIN — ONDANSETRON 4 MG: 2 INJECTION INTRAMUSCULAR; INTRAVENOUS at 12:41

## 2024-08-18 RX ADMIN — ROCURONIUM BROMIDE 40 MG: 10 INJECTION, SOLUTION INTRAVENOUS at 19:31

## 2024-08-18 RX ADMIN — LIDOCAINE HYDROCHLORIDE 50 MG: 10 INJECTION, SOLUTION EPIDURAL; INFILTRATION; INTRACAUDAL; PERINEURAL at 19:13

## 2024-08-18 RX ADMIN — FENTANYL CITRATE 25 MCG: 50 INJECTION, SOLUTION INTRAMUSCULAR; INTRAVENOUS at 19:49

## 2024-08-18 RX ADMIN — ACETAMINOPHEN 1000 MG: 500 TABLET ORAL at 21:10

## 2024-08-18 RX ADMIN — PROPOFOL 200 MG: 10 INJECTION, EMULSION INTRAVENOUS at 19:14

## 2024-08-18 RX ADMIN — SODIUM CHLORIDE, PRESERVATIVE FREE 10 ML: 5 INJECTION INTRAVENOUS at 21:11

## 2024-08-18 RX ADMIN — ETHOSUXIMIDE 250 MG: 250 CAPSULE ORAL at 22:57

## 2024-08-18 RX ADMIN — PHENYLEPHRINE HYDROCHLORIDE 100 MCG: 10 INJECTION INTRAVENOUS at 19:39

## 2024-08-18 RX ADMIN — DEXAMETHASONE SODIUM PHOSPHATE 4 MG: 10 INJECTION, SOLUTION INTRAMUSCULAR; INTRAVENOUS at 19:28

## 2024-08-18 RX ADMIN — PIPERACILLIN AND TAZOBACTAM 3375 MG: 3; .375 INJECTION, POWDER, LYOPHILIZED, FOR SOLUTION INTRAVENOUS at 18:12

## 2024-08-18 RX ADMIN — ONDANSETRON 4 MG: 2 INJECTION INTRAMUSCULAR; INTRAVENOUS at 19:27

## 2024-08-18 RX ADMIN — MORPHINE SULFATE 2 MG: 4 INJECTION INTRAVENOUS at 14:48

## 2024-08-18 ASSESSMENT — PAIN DESCRIPTION - ORIENTATION
ORIENTATION: RIGHT;LOWER
ORIENTATION: RIGHT;LOWER

## 2024-08-18 ASSESSMENT — PAIN DESCRIPTION - LOCATION
LOCATION: ABDOMEN
LOCATION: ABDOMEN

## 2024-08-18 ASSESSMENT — PAIN - FUNCTIONAL ASSESSMENT
PAIN_FUNCTIONAL_ASSESSMENT: FACE, LEGS, ACTIVITY, CRY, AND CONSOLABILITY (FLACC)
PAIN_FUNCTIONAL_ASSESSMENT: ACTIVITIES ARE NOT PREVENTED
PAIN_FUNCTIONAL_ASSESSMENT: 0-10
PAIN_FUNCTIONAL_ASSESSMENT: ACTIVITIES ARE NOT PREVENTED

## 2024-08-18 ASSESSMENT — PAIN DESCRIPTION - DESCRIPTORS
DESCRIPTORS: ACHING
DESCRIPTORS: ACHING

## 2024-08-18 ASSESSMENT — PAIN SCALES - GENERAL
PAINLEVEL_OUTOF10: 0
PAINLEVEL_OUTOF10: 8
PAINLEVEL_OUTOF10: 0
PAINLEVEL_OUTOF10: 9

## 2024-08-18 NOTE — ED PROVIDER NOTES
[facility] ED  Emergency Department Encounter  Emergency Medicine Resident     Pt Name:Justino Dick  MRN: [id number]  Birthdate 1975  Date of evaluation: 8/18/24  PCP:  Leticia Caro APRN - CNP  Note Started: 12:22 PM EDT      CHIEF COMPLAINT       Chief Complaint   Patient presents with    Abdominal Pain     RLQ       HISTORY OF PRESENT ILLNESS  (Location/Symptom, Timing/Onset, Context/Setting, Quality, Duration, Modifying Factors, Severity.)      Justino Dick is a 49 y.o. male who presents with right lower quadrant pain that began late last night, possibly early this morning.  He describes the pain as a sharpness that radiates to his back, especially with lying flat.  He has never had symptoms like this before.  Wife at bedside reports that he normally has a high pain tolerance, knew something was wrong when he agreed to come to the hospital without a fight.  She also reports that he has appeared more short of breath over the last few days, seems to be getting winded easily.  Patient reports that the car ride over was very painful, worsened his symptoms.  He has been passing gas, bowel movements have been smaller than normal but no constipation. He denies fever, chills, nausea, vomiting, chest pain, or any other symptoms at this time.    PAST MEDICAL / SURGICAL / SOCIAL / FAMILY HISTORY      has a past medical history of ADHD, Depression, Epilepsy (HCC), Migraine, Mitral valve prolapse, and Status post VNS (vagus nerve stimulator) placement.     has a past surgical history that includes Knee arthroscopy; shoulder surgery (Left); vascular surgery (Bilateral); and Tonsillectomy.    Social History     Socioeconomic History    Marital status:      Spouse name: Not on file    Number of children: Not on file    Years of education: Not on file    Highest education level: Not on file   Occupational History    Not on file   Tobacco Use    Smoking status: Never    Smokeless tobacco:

## 2024-08-18 NOTE — ED NOTES
ED to inpatient nurses report      Chief Complaint:  Chief Complaint   Patient presents with    Abdominal Pain     RLQ     Present to ED from: Pt to ED from home    MOA:     LOC: alert and orientated to name, place, date  Mobility: Independent  Oxygen Baseline: Room air    Current needs required: None   Pending ED orders: zosyn  Present condition: Stable    Why did the patient come to the ED? Pt to ED for RLQ pain he has had since early this morning. Pt denies vomiting. Pt is still having bm's but not a lot he says. Pt states when he lays down it radiates to his back. Pt states he still has his appendix.   What is the plan? Surgery   Any procedures or intervention occur? Labs  Any safety concerns?    Mental Status:       Psych Assessment:   Psychosocial  Psychosocial (WDL): Within Defined Limits  Vital signs   Vitals:    08/18/24 1217 08/18/24 1559   BP: 115/77 137/86   Pulse: (!) 114    Resp: 20    Temp: 97.3 °F (36.3 °C)    SpO2: 97% 100%        Vitals:  Patient Vitals for the past 24 hrs:   BP Temp Pulse Resp SpO2   08/18/24 1559 137/86 -- -- -- 100 %   08/18/24 1217 115/77 97.3 °F (36.3 °C) (!) 114 20 97 %      Visit Vitals  /86   Pulse (!) 114   Temp 97.3 °F (36.3 °C)   Resp 20   SpO2 100%        LDAs:   Peripheral IV 08/18/24 Left Femoral (Active)   Site Assessment Clean, dry & intact 08/18/24 1227   Line Status Specimen collected 08/18/24 1227       Ambulatory Status:  Presents to emergency department  because of falls (Syncope, seizure, or loss of consciousness): No, Age > 70: No, Altered Mental Status, Intoxication with alcohol or substance confusion (Disorientation, impaired judgment, poor safety awaremess, or inability to follow instructions): No, Impaired Mobility: Ambulates or transfers with assistive devices or assistance; Unable to ambulate or transer.: No, Nursing Judgement: No    Diagnosis:  DISPOSITION Admitted 08/18/2024 05:40:09 PM   Final diagnoses:   Acute appendicitis with generalized  peritonitis without gangrene, perforation, or abscess        Consults:  IP CONSULT TO GENERAL SURGERY     Treatment Team:   Treatment Team:   Jaime Kramer MD Smith, Madelyn, RN Afaneh, Amer Abdul-Hafiz, MD    Treatment:  ED Course as of 08/18/24 1752   Sun Aug 18, 2024   1301 Patient reevaluated.  Pain somewhat improved.  Awaiting imaging studies. [JF]   1424 CT ABDOMEN PELVIS W IV CONTRAST Additional Contrast? None  Acute appendicitis. No evidence for perforation or abscess formation. [JF]   1427 Updated patient and wife on findings.  Awaiting recommendations from general surgery [JF]   1602 Spoke with general surgery, will consent and book for OR [JF]      ED Course User Index  [JF] Sanna Knox,           Skin Assessment:        Pain Score:  Pain Assessment  Pain Assessment: 0-10  Pain Level: 9  Patient's Stated Pain Goal: 0 - No pain  Pain Location: Abdomen  Pain Orientation: Right, Lower  Pain Descriptors: Aching  Functional Pain Assessment: Activities are not prevented      SOCIAL HISTORY       Social History     Socioeconomic History    Marital status:      Spouse name: None    Number of children: None    Years of education: None    Highest education level: None   Tobacco Use    Smoking status: Never    Smokeless tobacco: Never   Substance and Sexual Activity    Alcohol use: Yes     Comment: occ    Drug use: Never     Social Determinants of Health     Financial Resource Strain: High Risk (7/3/2024)    Overall Financial Resource Strain (CARDIA)     Difficulty of Paying Living Expenses: Very hard   Food Insecurity: Food Insecurity Present (7/3/2024)    Hunger Vital Sign     Worried About Running Out of Food in the Last Year: Often true     Ran Out of Food in the Last Year: Often true   Transportation Needs: Unmet Transportation Needs (7/3/2024)    PRAPARE - Transportation     Lack of Transportation (Non-Medical): Yes   Physical Activity: Sufficiently Active (8/14/2023)    Exercise

## 2024-08-18 NOTE — ED PROVIDER NOTES
[facility] ED     Emergency Department     Faculty Attestation    I performed a history and physical examination of the patient and discussed management with the resident. I reviewed the resident’s note and agree with the documented findings and plan of care. Any areas of disagreement are noted on the chart. I was personally present for the key portions of any procedures. I have documented in the chart those procedures where I was not present during the key portions. I have reviewed the emergency nurses triage note. I agree with the chief complaint, past medical history, past surgical history, allergies, medications, social and family history as documented unless otherwise noted below. For Physician Assistant/ Nurse Practitioner cases/documentation I have personally evaluated this patient and have completed at least one if not all key elements of the E/M (history, physical exam, and MDM). Additional findings are as noted.    1:00 PM EDT    Patient presents with right lower quadrant pain that woke him from sleep this morning.  He says he has been having some liquidy stools for the past couple of days.  He denies any blood in the stool.  He denies any testicular pain or changes with urination.  He says he has had some nausea but denies vomiting.  He denies any recent fever, cough, congestion, chest pain or shortness of breath.  He denies any history of surgeries to the abdomen.  On exam, patient is lying in the bed and appears mildly uncomfortable.  Lungs clear to auscultation bilaterally.  Abdomen is soft with moderate right lower quadrant tenderness.  Will get CT scan of the abdomen and labs and treat patient's pain and nausea and reassess.    EKG Interpretation    Interpreted by emergency department physician    Rhythm: normal sinus   Rate: normal  Axis: normal  Ectopy: none  Conduction: normal  ST Segments: normal  T Waves: non specific changes  Q Waves: none    Clinical Impression: non-specific

## 2024-08-18 NOTE — ED TRIAGE NOTES
Pt to ED for RLQ pain he has had since early this morning. Pt denies vomiting. Pt is still having bm's but not a lot he says. Pt states when he lays down it radiates to his back. Pt states he still has his appendix.

## 2024-08-18 NOTE — CONSULTS
General Surgery:  Consult Note        PATIENT NAME: Justino Dick   YOB: 1975    ADMISSION DATE: 8/18/2024 12:21 PM     Admitting Provider: [unfilled]    Consulted Physician: Dr. Kramer     TODAY'S DATE: 8/18/2024    Chief Complaint:  Abdominal pain and nausea  Consult Regarding:  Acute Appendicitis on CT    HISTORY OF PRESENT ILLNESS:  The patient is a 49 y.o. male  presented to the ER on 08/18/24 with nausea and abdominal pain that started this morning. Pt states that he had RLQ pain since this morning along with liquid stools over the last few days. Abdominal pain now radiates to the back. Pt denies change in bowel or bladder function or testicular pain. Pt denies CP, dizziness, admits to shortness of breath with intermittent wheezing. Pt has a PMH of Marfan's syndrome, mitral valve prolapse, epilepsy, and insertion of a vagus nerve stimulator for migraines. Pt last had a light meal earlier this morning. Pt is not hungry, has rebound tenderness, WBC is 14.3, afebrile, with stable vitals. CT revealed acute appendicitis with 2 appendicoliths and  significant Julia appendiceal fat stranding.       Past Medical History:        Diagnosis Date    ADHD     Depression     Epilepsy (HCC)     Migraine     Mitral valve prolapse     Status post VNS (vagus nerve stimulator) placement        Past Surgical History:        Procedure Laterality Date    KNEE ARTHROSCOPY      SHOULDER SURGERY Left     TONSILLECTOMY      VASCULAR SURGERY Bilateral     Ligation and strip       Medications Prior to Admission:   Not in a hospital admission.    Allergies:  Cenobamate, Lisinopril, and Rufinamide    Social History:   Social History     Socioeconomic History    Marital status:      Spouse name: Not on file    Number of children: Not on file    Years of education: Not on file    Highest education level: Not on file   Occupational History    Not on file   Tobacco Use    Smoking status: Never    Smokeless tobacco:  Never   Substance and Sexual Activity    Alcohol use: Yes     Comment: occ    Drug use: Never    Sexual activity: Not on file   Other Topics Concern    Not on file   Social History Narrative    Not on file     Social Determinants of Health     Financial Resource Strain: High Risk (7/3/2024)    Overall Financial Resource Strain (CARDIA)     Difficulty of Paying Living Expenses: Very hard   Food Insecurity: Food Insecurity Present (7/3/2024)    Hunger Vital Sign     Worried About Running Out of Food in the Last Year: Often true     Ran Out of Food in the Last Year: Often true   Transportation Needs: Unmet Transportation Needs (7/3/2024)    PRAPARE - Transportation     Lack of Transportation (Medical): Not on file     Lack of Transportation (Non-Medical): Yes   Physical Activity: Sufficiently Active (8/14/2023)    Exercise Vital Sign     Days of Exercise per Week: 4 days     Minutes of Exercise per Session: 40 min   Stress: Not on file   Social Connections: Not on file   Intimate Partner Violence: Not At Risk (8/14/2023)    Humiliation, Afraid, Rape, and Kick questionnaire     Fear of Current or Ex-Partner: No     Emotionally Abused: No     Physically Abused: No     Sexually Abused: No   Housing Stability: Unknown (7/3/2024)    Housing Stability Vital Sign     Unable to Pay for Housing in the Last Year: Not on file     Number of Places Lived in the Last Year: Not on file     Unstable Housing in the Last Year: No       Family History:   History reviewed. No pertinent family history.    REVIEW OF SYSTEMS:    CONSTITUTIONAL: denies fevers, chills. Admits to fatigue  CARDIOVASCULAR: denies CP, dizziness, vision changes  RESPIRATORY: admits SOA, wheezing  GASTROINTESTINAL: admits to abd pain and nausea and diarrhea  GENITOURINARY: Denies increased frequency or dysuria.    PHYSICAL EXAM:    VITALS:  /86   Pulse (!) 114   Temp 97.3 °F (36.3 °C)   Resp 20   SpO2 100%   INTAKE/OUTPUT:   No intake or output data in the

## 2024-08-19 VITALS
DIASTOLIC BLOOD PRESSURE: 74 MMHG | BODY MASS INDEX: 24.68 KG/M2 | HEART RATE: 67 BPM | SYSTOLIC BLOOD PRESSURE: 124 MMHG | RESPIRATION RATE: 16 BRPM | OXYGEN SATURATION: 97 % | HEIGHT: 77 IN | WEIGHT: 209 LBS | TEMPERATURE: 97.8 F

## 2024-08-19 LAB
ANION GAP SERPL CALCULATED.3IONS-SCNC: 9 MMOL/L (ref 9–16)
BASOPHILS # BLD: 0 K/UL (ref 0–0.2)
BASOPHILS NFR BLD: 0 % (ref 0–2)
BUN SERPL-MCNC: 14 MG/DL (ref 6–20)
CALCIUM SERPL-MCNC: 9.3 MG/DL (ref 8.6–10.4)
CHLORIDE SERPL-SCNC: 106 MMOL/L (ref 98–107)
CO2 SERPL-SCNC: 21 MMOL/L (ref 20–31)
CREAT SERPL-MCNC: 0.9 MG/DL (ref 0.7–1.2)
EKG ATRIAL RATE: 78 BPM
EKG P AXIS: 58 DEGREES
EKG P-R INTERVAL: 108 MS
EKG Q-T INTERVAL: 396 MS
EKG QRS DURATION: 102 MS
EKG QTC CALCULATION (BAZETT): 451 MS
EKG R AXIS: 78 DEGREES
EKG T AXIS: 72 DEGREES
EKG VENTRICULAR RATE: 78 BPM
EOSINOPHIL # BLD: 0 K/UL (ref 0–0.4)
EOSINOPHILS RELATIVE PERCENT: 0 % (ref 1–4)
ERYTHROCYTE [DISTWIDTH] IN BLOOD BY AUTOMATED COUNT: 12.7 % (ref 11.8–14.4)
GFR, ESTIMATED: >90 ML/MIN/1.73M2
GLUCOSE SERPL-MCNC: 124 MG/DL (ref 74–99)
HCT VFR BLD AUTO: 43.1 % (ref 40.7–50.3)
HGB BLD-MCNC: 14.2 G/DL (ref 13–17)
IMM GRANULOCYTES # BLD AUTO: 0 K/UL (ref 0–0.3)
IMM GRANULOCYTES NFR BLD: 0 %
LYMPHOCYTES NFR BLD: 2.1 K/UL (ref 1–4.8)
LYMPHOCYTES RELATIVE PERCENT: 12 % (ref 24–44)
MCH RBC QN AUTO: 29.9 PG (ref 25.2–33.5)
MCHC RBC AUTO-ENTMCNC: 32.9 G/DL (ref 28.4–34.8)
MCV RBC AUTO: 90.7 FL (ref 82.6–102.9)
MONOCYTES NFR BLD: 12 % (ref 1–7)
MONOCYTES NFR BLD: 2.1 K/UL (ref 0.1–0.8)
MORPHOLOGY: NORMAL
NEUTROPHILS NFR BLD: 76 % (ref 36–66)
NEUTS SEG NFR BLD: 13.3 K/UL (ref 1.8–7.7)
NRBC BLD-RTO: 0 PER 100 WBC
PLATELET # BLD AUTO: 235 K/UL (ref 138–453)
PMV BLD AUTO: 10.5 FL (ref 8.1–13.5)
POTASSIUM SERPL-SCNC: 4 MMOL/L (ref 3.7–5.3)
RBC # BLD AUTO: 4.75 M/UL (ref 4.21–5.77)
SODIUM SERPL-SCNC: 136 MMOL/L (ref 136–145)
WBC OTHER # BLD: 17.5 K/UL (ref 3.5–11.3)

## 2024-08-19 PROCEDURE — 2580000003 HC RX 258

## 2024-08-19 PROCEDURE — 96376 TX/PRO/DX INJ SAME DRUG ADON: CPT

## 2024-08-19 PROCEDURE — 80048 BASIC METABOLIC PNL TOTAL CA: CPT

## 2024-08-19 PROCEDURE — 6370000000 HC RX 637 (ALT 250 FOR IP)

## 2024-08-19 PROCEDURE — 36415 COLL VENOUS BLD VENIPUNCTURE: CPT

## 2024-08-19 PROCEDURE — 96375 TX/PRO/DX INJ NEW DRUG ADDON: CPT

## 2024-08-19 PROCEDURE — 96372 THER/PROPH/DIAG INJ SC/IM: CPT

## 2024-08-19 PROCEDURE — 99024 POSTOP FOLLOW-UP VISIT: CPT | Performed by: PHYSICIAN ASSISTANT

## 2024-08-19 PROCEDURE — G0378 HOSPITAL OBSERVATION PER HR: HCPCS

## 2024-08-19 PROCEDURE — 85025 COMPLETE CBC W/AUTO DIFF WBC: CPT

## 2024-08-19 PROCEDURE — 6360000002 HC RX W HCPCS

## 2024-08-19 PROCEDURE — 96365 THER/PROPH/DIAG IV INF INIT: CPT

## 2024-08-19 PROCEDURE — 96366 THER/PROPH/DIAG IV INF ADDON: CPT

## 2024-08-19 RX ORDER — CARVEDILOL 3.12 MG/1
3.12 TABLET ORAL 2 TIMES DAILY WITH MEALS
Status: DISCONTINUED | OUTPATIENT
Start: 2024-08-19 | End: 2024-08-19 | Stop reason: HOSPADM

## 2024-08-19 RX ORDER — LOSARTAN POTASSIUM 25 MG/1
12.5 TABLET ORAL DAILY
Status: DISCONTINUED | OUTPATIENT
Start: 2024-08-19 | End: 2024-08-19 | Stop reason: HOSPADM

## 2024-08-19 RX ORDER — OXYCODONE HYDROCHLORIDE 5 MG/1
5 TABLET ORAL EVERY 6 HOURS PRN
Qty: 20 TABLET | Refills: 0 | Status: SHIPPED | OUTPATIENT
Start: 2024-08-19 | End: 2024-08-24

## 2024-08-19 RX ORDER — SODIUM CHLORIDE, SODIUM LACTATE, POTASSIUM CHLORIDE, CALCIUM CHLORIDE 600; 310; 30; 20 MG/100ML; MG/100ML; MG/100ML; MG/100ML
INJECTION, SOLUTION INTRAVENOUS CONTINUOUS
Status: DISCONTINUED | OUTPATIENT
Start: 2024-08-19 | End: 2024-08-19

## 2024-08-19 RX ORDER — ENOXAPARIN SODIUM 100 MG/ML
40 INJECTION SUBCUTANEOUS 2 TIMES DAILY
Status: DISCONTINUED | OUTPATIENT
Start: 2024-08-19 | End: 2024-08-19 | Stop reason: HOSPADM

## 2024-08-19 RX ADMIN — KETOROLAC TROMETHAMINE 15 MG: 30 INJECTION, SOLUTION INTRAMUSCULAR; INTRAVENOUS at 04:05

## 2024-08-19 RX ADMIN — LOSARTAN POTASSIUM 12.5 MG: 25 TABLET, FILM COATED ORAL at 09:12

## 2024-08-19 RX ADMIN — ENOXAPARIN SODIUM 40 MG: 100 INJECTION SUBCUTANEOUS at 08:35

## 2024-08-19 RX ADMIN — ACETAMINOPHEN 1000 MG: 500 TABLET ORAL at 12:13

## 2024-08-19 RX ADMIN — PIPERACILLIN AND TAZOBACTAM 3375 MG: 3; .375 INJECTION, POWDER, LYOPHILIZED, FOR SOLUTION INTRAVENOUS at 12:11

## 2024-08-19 RX ADMIN — ETHOSUXIMIDE 250 MG: 250 CAPSULE ORAL at 09:11

## 2024-08-19 RX ADMIN — KETOROLAC TROMETHAMINE 15 MG: 30 INJECTION, SOLUTION INTRAMUSCULAR; INTRAVENOUS at 13:42

## 2024-08-19 RX ADMIN — BRIVARACETAM 100 MG: 50 TABLET, FILM COATED ORAL at 10:52

## 2024-08-19 RX ADMIN — PIPERACILLIN AND TAZOBACTAM 3375 MG: 3; .375 INJECTION, POWDER, LYOPHILIZED, FOR SOLUTION INTRAVENOUS at 04:09

## 2024-08-19 RX ADMIN — ACETAMINOPHEN 1000 MG: 500 TABLET ORAL at 04:04

## 2024-08-19 RX ADMIN — CLOBAZAM 10 MG: 10 TABLET ORAL at 09:10

## 2024-08-19 RX ADMIN — KETOROLAC TROMETHAMINE 15 MG: 30 INJECTION, SOLUTION INTRAMUSCULAR; INTRAVENOUS at 08:34

## 2024-08-19 RX ADMIN — SODIUM CHLORIDE, POTASSIUM CHLORIDE, SODIUM LACTATE AND CALCIUM CHLORIDE: 600; 310; 30; 20 INJECTION, SOLUTION INTRAVENOUS at 07:44

## 2024-08-19 RX ADMIN — SODIUM CHLORIDE, PRESERVATIVE FREE 10 ML: 5 INJECTION INTRAVENOUS at 08:39

## 2024-08-19 RX ADMIN — Medication 400 MG: at 09:11

## 2024-08-19 RX ADMIN — CARVEDILOL 3.12 MG: 3.12 TABLET, FILM COATED ORAL at 09:10

## 2024-08-19 ASSESSMENT — PAIN DESCRIPTION - PAIN TYPE
TYPE: ACUTE PAIN;SURGICAL PAIN

## 2024-08-19 ASSESSMENT — PAIN DESCRIPTION - LOCATION
LOCATION: ABDOMEN

## 2024-08-19 ASSESSMENT — PAIN DESCRIPTION - ORIENTATION
ORIENTATION: RIGHT;LEFT;UPPER;LOWER
ORIENTATION: RIGHT;LEFT;UPPER;LOWER
ORIENTATION: LEFT;RIGHT;UPPER;LOWER

## 2024-08-19 ASSESSMENT — PAIN - FUNCTIONAL ASSESSMENT
PAIN_FUNCTIONAL_ASSESSMENT: ACTIVITIES ARE NOT PREVENTED

## 2024-08-19 ASSESSMENT — PAIN DESCRIPTION - FREQUENCY
FREQUENCY: INTERMITTENT

## 2024-08-19 ASSESSMENT — PAIN SCALES - GENERAL
PAINLEVEL_OUTOF10: 0
PAINLEVEL_OUTOF10: 3
PAINLEVEL_OUTOF10: 4
PAINLEVEL_OUTOF10: 4
PAINLEVEL_OUTOF10: 3
PAINLEVEL_OUTOF10: 4

## 2024-08-19 ASSESSMENT — PAIN DESCRIPTION - DESCRIPTORS
DESCRIPTORS: ACHING

## 2024-08-19 NOTE — PROGRESS NOTES
CLINICAL PHARMACY NOTE: MEDS TO BEDS    Total # of Prescriptions Filled: 2   The following medications were delivered to the patient:  Oxycodone 5mg  Augmentin 875-125mg    Additional Documentation: delivered to patient in room OBS 12 8/19 at 4:12pm. No co-pay.

## 2024-08-19 NOTE — PROGRESS NOTES
PROGRESS NOTE          PATIENT NAME: Justino Dick  MEDICAL RECORD NO. [id number]  DATE: 8/19/2024    HD: # 1      Patient Active Problem List   Diagnosis    Attention deficit hyperactivity disorder (ADHD)    Bradycardia    Dilated cardiomyopathy (HCC)    Epileptic seizure (HCC)    Essential (primary) hypertension    Marfan syndrome    Migraine with aura    Severe episode of recurrent major depressive disorder, without psychotic features (HCC)    S/P placement of VNS (vagus nerve stimulation) device    Mitral valve prolapse    Aortic root dilatation (HCC)    Other generalized epilepsy and epileptic syndromes, not intractable, without status epilepticus (HCC)    Pectus excavatum    Scoliosis    Tall stature    Acute appendicitis       DIAGNOSIS AND PLAN    49 year old male s/p lap appy 8/18  Advance to regular diet as tolerated  Possible discharge home later today    Chief Complaint: \"doing ok\"    SUBJECTIVE  No acute events overnight. His pain is controlled and he is tolerating sips of water. No nausea/vomiting. He is looking forward to taking his son to college in a few days.     OBJECTIVE  VITALS:   Vitals:    08/19/24 0731   BP: 120/80   Pulse: 60   Resp: 16   Temp: 97.6 °F (36.4 °C)   SpO2: 98%     Physical Exam  Constitutional:       Appearance: Normal appearance.   HENT:      Head: Normocephalic and atraumatic.   Cardiovascular:      Rate and Rhythm: Normal rate.   Pulmonary:      Effort: Pulmonary effort is normal.   Abdominal:      General: There is no distension.      Tenderness: There is abdominal tenderness.      Comments: Incisions clean, dry and intact, appropriately tender   Skin:     General: Skin is warm and dry.   Neurological:      Mental Status: He is alert.   Psychiatric:         Mood and Affect: Mood normal.       LAB:  CBC:   Recent Labs     08/18/24  1234 08/19/24  0645   WBC 14.7* 17.5*   HGB 15.8 14.2   HCT 48.0 43.1   MCV 91.6 90.7    235     BMP:   Recent Labs     08/18/24  1234  08/19/24  0645    136   K 3.8 4.0    106   CO2 23 21   BUN 14 14   CREATININE 1.0 0.9   GLUCOSE 123* 124*       RADIOLOGY:      Jackie Rosenthal PA-C  8/19/2024, 10:03 AM

## 2024-08-19 NOTE — PLAN OF CARE
Problem: Discharge Planning  Goal: Discharge to home or other facility with appropriate resources  Outcome: Progressing  Flowsheets (Taken 8/19/2024 1452)  Discharge to home or other facility with appropriate resources:   Identify barriers to discharge with patient and caregiver   Arrange for needed discharge resources and transportation as appropriate   Identify discharge learning needs (meds, wound care, etc)     Problem: Pain  Goal: Verbalizes/displays adequate comfort level or baseline comfort level  Outcome: Progressing  Flowsheets (Taken 8/19/2024 1452)  Verbalizes/displays adequate comfort level or baseline comfort level:   Encourage patient to monitor pain and request assistance   Assess pain using appropriate pain scale   Administer analgesics based on type and severity of pain and evaluate response     Problem: ABCDS Injury Assessment  Goal: Absence of physical injury  Outcome: Progressing  Flowsheets (Taken 8/19/2024 1452)  Absence of Physical Injury: Implement safety measures based on patient assessment     
  Problem: Pain  Goal: Verbalizes/displays adequate comfort level or baseline comfort level  Outcome: Progressing     Problem: ABCDS Injury Assessment  Goal: Absence of physical injury  Outcome: Progressing     
Full H and P to follow      48yo M with acute appendicitis.   Plan for OR for lap appy today.  IV abx.      Jaime Kramer MD  
increased time due to pain

## 2024-08-19 NOTE — DISCHARGE SUMMARY
DISCHARGE SUMMARY:    PATIENT NAME:  Justino Dick  YOB: 1975  MEDICAL RECORD NO. [id number]  DATE: 08/19/24  PRIMARY CARE PHYSICIAN: Leticia Caro APRN - CNP  ADMIT DATE:  8/18/2024    DISCHARGE DATE:  8/19/24  DISPOSITION:  home  ADMITTING DIAGNOSIS:   appendicitis    DIAGNOSIS:   Patient Active Problem List   Diagnosis    Attention deficit hyperactivity disorder (ADHD)    Bradycardia    Dilated cardiomyopathy (HCC)    Epileptic seizure (HCC)    Essential (primary) hypertension    Marfan syndrome    Migraine with aura    Severe episode of recurrent major depressive disorder, without psychotic features (HCC)    S/P placement of VNS (vagus nerve stimulation) device    Mitral valve prolapse    Aortic root dilatation (HCC)    Other generalized epilepsy and epileptic syndromes, not intractable, without status epilepticus (HCC)    Pectus excavatum    Scoliosis    Tall stature    Acute appendicitis       CONSULTANTS:  none    PROCEDURES:   Laparoscopic appendectomy    HOSPITAL COURSE:   Justino Dick is a 49 y.o. male who was admitted on 8/18/2024  Hospital Course:    Gen surg  CC: RLQ pain    Injuries: Appendicitis  Mhx: Epilepsy, ADHD, dilated cardiomyopathy, HTN, marfan syndrome, migraines w/ aura, aortic root dilitation, mitral valve prolapse, pectus excavatum, MDD w/ psychosis  Shx: Vagus nerve stimulation device 2017    Hospital Course:  8/18/2024: OR lap appy  8/19: regular diet        Labs and imaging were followed daily.      On day of discharge Justino Dick  was tolerating a regular diet  had adequate analgesia on oral medications  had no signs of complication.  He was deemed medically stable for discharge to Home        PHYSICAL EXAMINATION:        Discharge Vitals:  height is 1.956 m (6' 5\") and weight is 94.8 kg (208 lb 15.9 oz). His oral temperature is 97.8 °F (36.6 °C). His blood pressure is 124/74 and his pulse is 67. His respiration is 16 and oxygen saturation is 97%.   Exam on day  appendix with 2 appendicoliths and significant Julia appendiceal fat stranding.  Compatible with acute appendicitis.  No evidence for perforation or abscess formation.  Evaluation of the colon shows no acute process. Pelvis: Urinary bladder unremarkable. Peritoneum/Retroperitoneum: No free fluid or significant lymphadenopathy. Bones/Soft Tissues: No acute abnormality of the bones.  The superficial soft tissues show no acute process..     Acute appendicitis. No evidence for perforation or abscess formation.       DISCHARGE INSTRUCTIONS     Discharge Medications:        Medication List        START taking these medications      oxyCODONE 5 MG immediate release tablet  Commonly known as: ROXICODONE  Take 1 tablet by mouth every 6 hours as needed for Pain for up to 5 days. Max Daily Amount: 20 mg            CONTINUE taking these medications      acetaminophen 325 MG tablet  Commonly known as: Tylenol  Take 1 tablet by mouth every 6 hours as needed for Pain     amphetamine-dextroamphetamine 20 MG tablet  Commonly known as: ADDERALL     Briviact 100 MG Tabs tablet  Generic drug: Brivaracetam     carvedilol 3.125 MG tablet  Commonly known as: COREG     cloBAZam 10 MG Tabs tablet  Commonly known as: ONFI     ethosuximide 250 MG capsule  Commonly known as: ZARONTIN     losartan 25 MG tablet  Commonly known as: COZAAR     MULTIVITAL-M PO     Riboflavin 400 MG Tabs     rizatriptan 10 MG tablet  Commonly known as: MAXALT     sertraline 100 MG tablet  Commonly known as: ZOLOFT     zonisamide 100 MG capsule  Commonly known as: ZONEGRAN               Where to Get Your Medications        These medications were sent to [facility], [place] - [address] [place] [address] -  [phone] - F [phone]  [address] [place] [id number]      Phone: [phone]   oxyCODONE 5 MG immediate release tablet       Diet: ADULT DIET; Regular diet as tolerated  Activity: As instructed WEIGHT BEARING STATUS: Weight bearing as

## 2024-08-19 NOTE — OP NOTE
Operative Note      Patient: Justino Dick  YOB: 1975  MRN: [id number]    Date of Procedure: 8/18/2024    Pre-Op Diagnosis Codes:      * Appendicitis, unspecified appendicitis type [K37]    Post-Op Diagnosis: Same       Procedure(s):  **E-2** APPENDECTOMY LAPAROSCOPIC    Surgeon(s):  Jaime Kramer MD    Assistant:   Resident: Karina Sandhu DO    Anesthesia: General    Estimated Blood Loss (mL): Minimal    Complications: None    Specimens:   ID Type Source Tests Collected by Time Destination   A : APPENDIX Tissue Appendix SURGICAL PATHOLOGY Jaime Kramer MD 8/18/2024 1947        Implants:  * No implants in log *      Drains:   Urinary Catheter 08/18/24 Trevino (Active)       Findings:  Infection Present At Time Of Surgery (PATOS) (choose all levels that have infection present):  - Organ Space infection (below fascia) present as evidenced by purulent fluid and perforated acute appendicitis  Other Findings: acute appendicitis with small tiny area of perforation.     Detailed Description of Procedure:   This is a 48 yo M with signs and symptoms of acute appendicitis. Risks and benefits of the operation were explained to the patient and he was agreeable to proceed. General anesthesia was provided and the patient was intubated. The abdomen was prepped and draped in the usual sterile fashion. Time out was performed. Local anesthetic was used. A 12mm incision was made in the supraumbilical midline. The fascia was incised and a hason trocar was inserted and the abdomen was prepped and draped in the usual sterile fashion. Two 5 mm ports were placed , one in the suprapubic midline and one in the left lower quadrant. The appendix was noted to be acutely inflamed with a small area of localized perforation. A grasper was used to grasp the appendix and retract it and the base of the appendix was dissected out. A endo kasie 45 load was used to transect the base of the appendix and the ligasure

## 2024-08-19 NOTE — CARE COORDINATION
Case Management Assessment  Initial Evaluation    Date/Time of Evaluation: 8/19/2024 11:04 AM  Assessment Completed by: JUSTINO ANDERSON RN    If patient is discharged prior to next notation, then this note serves as note for discharge by case management.    Patient Name: Justino Dick                   YOB: 1975  Diagnosis: Acute appendicitis [K35.80]  Acute appendicitis with generalized peritonitis without gangrene, perforation, or abscess [K35.200]                   Date / Time: 8/18/2024 12:21 PM    Patient Admission Status: Inpatient   Readmission Risk (Low < 19, Mod (19-27), High > 27): Readmission Risk Score: 5.2    Current PCP: Leticia Caro APRN - CNP  PCP verified by CM? Yes    Chart Reviewed: Yes      History Provided by: Patient  Patient Orientation: Alert and Oriented    Patient Cognition: Alert    Hospitalization in the last 30 days (Readmission):  No    If yes, Readmission Assessment in CM Navigator will be completed.    Advance Directives:      Code Status: Full Code   Patient's Primary Decision Maker is: Legal Next of Kin      Discharge Planning:    Patient lives with: Spouse/Significant Other Type of Home: House  Primary Care Giver: Self  Patient Support Systems include: Spouse/Significant Other   Current Financial resources: Medicare, Medicaid  Current community resources: None  Current services prior to admission: Durable Medical Equipment, None            Current DME:  (none)            Type of Home Care services:  None    ADLS  Prior functional level: Independent in ADLs/IADLs  Current functional level: Independent in ADLs/IADLs    PT AM-PAC:   /24  OT AM-PAC:   /24    Family can provide assistance at DC: Yes  Would you like Case Management to discuss the discharge plan with any other family members/significant others, and if so, who? No  Plans to Return to Present Housing: Yes  Other Identified Issues/Barriers to RETURNING to current housing: none  Potential Assistance  needed at discharge: N/A          Patient expects to discharge to: House  Plan for transportation at discharge: Family    Financial    Payor: [facility] MEDICARE / Plan: [employer] / Product Type: *No Product type* /     Does insurance require precert for SNF: Yes    Potential assistance Purchasing Medications: No  Meds-to-Beds request: Yes      [id number] - [place], [place] - [address] -  [phone] - F [phone]  [address] [place] [address]  Phone: [phone] Fax: [phone]    [facility] - [place], [place] - [address] -  [phone] - F [phone]  [address] [place] [address]  Phone: [phone] Fax: [phone]      Notes:    Factors facilitating achievement of predicted outcomes: Family support    Barriers to discharge: clinical status    Additional Case Management Notes: home with wife    The Plan for Transition of Care is related to the following treatment goals of Acute appendicitis [K35.80]  Acute appendicitis with generalized peritonitis without gangrene, perforation, or abscess [K35.200]    IF APPLICABLE: The Patient and/or patient representative Justino and his family were provided with a choice of provider and agrees with the discharge plan. Freedom of choice list with basic dialogue that supports the patient's individualized plan of care/goals and shares the quality data associated with the providers was provided to: Patient    The Patient and/or Patient Representative Agree with the Discharge Plan? Yes    JUSTINO ANDERSON RN  Case Management Department

## 2024-08-19 NOTE — DISCHARGE INSTRUCTIONS
Discharge Instructions for General Surgery    No lifting above 10Ibs for next 2 weeks.  No soaking in bathtubs or submerging yourself in water for 4 weeks  Resume activity as tolerated, No operating heavy equipment while using narcotics Wash incision gently with soap and water, pat dry.  If steri-strips or surgical glue in place wash gently and leave in place until the glue or strips fall off. (Do not pull/tug)    F/u in trauma/acute care surgery clinic in 1-2 weeks Call your doctor for the following:  Chills  Temperature greater than 101  Pain that is not tolerable despite taking pain medicine as ordered There is increased swelling, redness or warmth at surgical site There is increased drainage or bleeding from surgical site    Recommended diet: regular diet  Depending on your injuries, your doctor may want you to follow a specific diet. Some pain medicine can cause constipation . To avoid this problem:  Drink plenty of fluids.  Eat foods high in fiber , such as:  Whole grain cereals and bread  Fruits and vegtables   Legumes (eg, beans, lentils)      If you are still having problems, talk to your doctor about using laxatives or stool softeners.    General questions or concerns call [phone] for the General Surgery Clinic.  If needed, the clinic fax number is [phone]

## 2024-08-19 NOTE — PROGRESS NOTES
PROGRESS NOTE          PATIENT NAME: Justino Dick  MEDICAL RECORD NO. [id number]  DATE: 2024  PRIMARY CARE PHYSICIAN: Leticia Caro, APRN - CNP    HD: # 1    ASSESSMENT    Patient Active Problem List   Diagnosis    Attention deficit hyperactivity disorder (ADHD)    Bradycardia    Dilated cardiomyopathy (HCC)    Epileptic seizure (HCC)    Essential (primary) hypertension    Marfan syndrome    Migraine with aura    Severe episode of recurrent major depressive disorder, without psychotic features (HCC)    S/P placement of VNS (vagus nerve stimulation) device    Mitral valve prolapse    Aortic root dilatation (HCC)    Other generalized epilepsy and epileptic syndromes, not intractable, without status epilepticus (HCC)    Pectus excavatum    Scoliosis    Tall stature    Acute appendicitis     49y.o. with acute appendicitis s/p lap appy on     MEDICAL DECISION MAKING AND PLAN    Regular diet today, if tolerating likely d/c this afternoon with office follow up in 2 weeks       Chief Complaint: \"wants to know is restrictions as he has surgery coming up in September\"    SUBJECTIVE    Justino Dick is has improved and is unchanged since yesterday. Some surgical site pain, alleviated by pain medications. No nausea/vomiting, abdomen soft. Is concerned that this surgery could interfere with upcoming surgery at [facility], he has zoom call with neurosurgereon on      OBJECTIVE  VITALS: Temp: Temp: 97.6 °F (36.4 °C)Temp  Av.4 °F (36.3 °C)  Min: 96.8 °F (36 °C)  Max: 97.9 °F (36.6 °C) BP Systolic (24hrs), Av , Min:111 , Max:137   Diastolic (24hrs), Av, Min:62, Max:86   Pulse Pulse  Av.8  Min: 60  Max: 114 Resp Resp  Av.3  Min: 12  Max: 20 Pulse ox SpO2  Av.1 %  Min: 95 %  Max: 100 %  CONSTITUTIONAL:  awake, alert, no apparent distress, and thin  HEENT: Normocephalic/atraumatic, without obvious abnormality.   CARDIOVASCULAR: Regular rate and rhythm  LUNGS: breathing unlabored  ABDOMEN:

## 2024-08-20 LAB — SURGICAL PATHOLOGY REPORT: NORMAL

## 2024-09-03 ENCOUNTER — OFFICE VISIT (OUTPATIENT)
Age: 49
End: 2024-09-03

## 2024-09-03 VITALS
BODY MASS INDEX: 24.56 KG/M2 | DIASTOLIC BLOOD PRESSURE: 80 MMHG | HEIGHT: 77 IN | WEIGHT: 208 LBS | SYSTOLIC BLOOD PRESSURE: 113 MMHG

## 2024-09-03 DIAGNOSIS — Z90.49 S/P APPY: Primary | ICD-10-CM

## 2024-09-03 PROCEDURE — 99024 POSTOP FOLLOW-UP VISIT: CPT | Performed by: NURSE PRACTITIONER

## 2024-09-03 ASSESSMENT — ENCOUNTER SYMPTOMS
GASTROINTESTINAL NEGATIVE: 1
RESPIRATORY NEGATIVE: 1

## 2024-09-03 NOTE — PROGRESS NOTES
General Surgery   [address] [facility], [facility] [address] [facility] [address]  [place] [address]  [phone]  [address] [place] [address]  [phone]  [email or web address]    Clinic Note - Post-op Patient      Patient: Justino Dick  MRN: [id number]  YOB: 1975 (49 y.o.)    Date of Office Visit: September 3, 2024     CC: Post op follow up    SUBJECTIVE:  Justino Dick is a 49 y.o. male who is seen at the [facility] surgery clinic for post op follow up from [facility] appy 8/18. Eating a regular diet without difficulty. Bowel movements are Normal.  The patient is not having any pain. He tested positive for covid about 5 days ago. He denies any fevers or concerns in regards to his incisions.     Review of Systems:  Review of Systems   Constitutional: Negative.    Respiratory: Negative.     Cardiovascular: Negative.    Gastrointestinal: Negative.          Physical Exam:    Vitals:    09/03/24 1419   BP: 113/80     Physical Exam  Constitutional:       Interventions: Cervical collar in place.   HENT:      Right Ear: No laceration or drainage. No hemotympanum.      Left Ear: No laceration or drainage. No hemotympanum.      Nose: No nasal deformity, septal deviation, signs of injury or laceration.      Mouth/Throat:      Mouth: No injury or lacerations.   Eyes:      Pupils: Pupils are equal.      Right eye: Pupil is reactive and not sluggish.      Left eye: Pupil is reactive and not sluggish.   Cardiovascular:      Pulses: No decreased pulses.   Pulmonary:      Effort: Pulmonary effort is normal.   Abdominal:      General: Abdomen is flat. There is no distension.      Comments: [facility] sites CDI, no concern for infection   Skin:     Coloration: Skin is not cyanotic or mottled.      Findings: No abrasion, burn, ecchymosis, signs of injury, laceration or wound.   Neurological:      Mental Status: He is alert and oriented to person, place, and time.       Pathology:     8/18/24  Received in

## 2025-01-06 ENCOUNTER — HOSPITAL ENCOUNTER (OUTPATIENT)
Dept: GENERAL RADIOLOGY | Age: 50
Discharge: HOME | End: 2025-01-08
Payer: MEDICAID

## 2025-01-06 ENCOUNTER — HOSPITAL ENCOUNTER (OUTPATIENT)
Age: 50
Discharge: HOME | End: 2025-01-08
Payer: MEDICAID

## 2025-01-06 ENCOUNTER — OFFICE VISIT (OUTPATIENT)
Dept: PRIMARY CARE CLINIC | Age: 50
End: 2025-01-06
Payer: MEDICARE

## 2025-01-06 ENCOUNTER — HOSPITAL ENCOUNTER (OUTPATIENT)
Age: 50
Discharge: HOME | End: 2025-01-06
Payer: MEDICAID

## 2025-01-06 VITALS
WEIGHT: 201 LBS | HEART RATE: 103 BPM | BODY MASS INDEX: 23.83 KG/M2 | DIASTOLIC BLOOD PRESSURE: 89 MMHG | TEMPERATURE: 98.4 F | OXYGEN SATURATION: 100 % | SYSTOLIC BLOOD PRESSURE: 125 MMHG

## 2025-01-06 DIAGNOSIS — I42.0 DILATED CARDIOMYOPATHY (HCC): ICD-10-CM

## 2025-01-06 DIAGNOSIS — R06.02 SHORTNESS OF BREATH: ICD-10-CM

## 2025-01-06 DIAGNOSIS — G40.409 TONIC CLONIC SEIZURES (HCC): ICD-10-CM

## 2025-01-06 DIAGNOSIS — B96.89 BACTERIAL SINUSITIS: ICD-10-CM

## 2025-01-06 DIAGNOSIS — F33.2 SEVERE EPISODE OF RECURRENT MAJOR DEPRESSIVE DISORDER, WITHOUT PSYCHOTIC FEATURES (HCC): Primary | ICD-10-CM

## 2025-01-06 DIAGNOSIS — J32.9 BACTERIAL SINUSITIS: ICD-10-CM

## 2025-01-06 DIAGNOSIS — B34.9 VIRAL ILLNESS: ICD-10-CM

## 2025-01-06 DIAGNOSIS — F33.2 SEVERE EPISODE OF RECURRENT MAJOR DEPRESSIVE DISORDER, WITHOUT PSYCHOTIC FEATURES (HCC): ICD-10-CM

## 2025-01-06 DIAGNOSIS — G40.409 OTHER GENERALIZED EPILEPSY AND EPILEPTIC SYNDROMES, NOT INTRACTABLE, WITHOUT STATUS EPILEPTICUS (HCC): ICD-10-CM

## 2025-01-06 LAB
ALBUMIN SERPL-MCNC: 4.5 G/DL (ref 3.5–5.2)
ALBUMIN/GLOB SERPL: 1.8 {RATIO} (ref 1–2.5)
ALP SERPL-CCNC: 118 U/L (ref 40–129)
ALT SERPL-CCNC: 36 U/L (ref 10–50)
ANION GAP SERPL CALCULATED.3IONS-SCNC: 11 MMOL/L (ref 9–16)
AST SERPL-CCNC: 28 U/L (ref 10–50)
BASOPHILS # BLD: 0.11 K/UL (ref 0–0.2)
BASOPHILS NFR BLD: 1 % (ref 0–2)
BILIRUB SERPL-MCNC: 0.2 MG/DL (ref 0–1.2)
BUN SERPL-MCNC: 11 MG/DL (ref 6–20)
CALCIUM SERPL-MCNC: 9.8 MG/DL (ref 8.6–10.4)
CHLORIDE SERPL-SCNC: 104 MMOL/L (ref 98–107)
CO2 SERPL-SCNC: 25 MMOL/L (ref 20–31)
CREAT SERPL-MCNC: 1 MG/DL (ref 0.7–1.2)
EOSINOPHIL # BLD: 0.13 K/UL (ref 0–0.44)
EOSINOPHILS RELATIVE PERCENT: 1 % (ref 1–4)
ERYTHROCYTE [DISTWIDTH] IN BLOOD BY AUTOMATED COUNT: 11.9 % (ref 11.8–14.4)
GFR, ESTIMATED: >90 ML/MIN/1.73M2
GLUCOSE SERPL-MCNC: 238 MG/DL (ref 74–99)
HCT VFR BLD AUTO: 47.7 % (ref 40.7–50.3)
HGB BLD-MCNC: 15.6 G/DL (ref 13–17)
IMM GRANULOCYTES # BLD AUTO: 0.08 K/UL (ref 0–0.3)
IMM GRANULOCYTES NFR BLD: 1 %
LYMPHOCYTES NFR BLD: 2.67 K/UL (ref 1.1–3.7)
LYMPHOCYTES RELATIVE PERCENT: 26 % (ref 24–43)
MCH RBC QN AUTO: 29.4 PG (ref 25.2–33.5)
MCHC RBC AUTO-ENTMCNC: 32.7 G/DL (ref 28.4–34.8)
MCV RBC AUTO: 89.8 FL (ref 82.6–102.9)
MONOCYTES NFR BLD: 0.92 K/UL (ref 0.1–1.2)
MONOCYTES NFR BLD: 9 % (ref 3–12)
NEUTROPHILS NFR BLD: 62 % (ref 36–65)
NEUTS SEG NFR BLD: 6.37 K/UL (ref 1.5–8.1)
NRBC BLD-RTO: 0 PER 100 WBC
PLATELET # BLD AUTO: 396 K/UL (ref 138–453)
PMV BLD AUTO: 9.7 FL (ref 8.1–13.5)
POTASSIUM SERPL-SCNC: 4.3 MMOL/L (ref 3.7–5.3)
PROT SERPL-MCNC: 7 G/DL (ref 6.6–8.7)
RBC # BLD AUTO: 5.31 M/UL (ref 4.21–5.77)
SODIUM SERPL-SCNC: 140 MMOL/L (ref 136–145)
WBC OTHER # BLD: 10.3 K/UL (ref 3.5–11.3)

## 2025-01-06 PROCEDURE — 85025 COMPLETE CBC W/AUTO DIFF WBC: CPT

## 2025-01-06 PROCEDURE — 1036F TOBACCO NON-USER: CPT | Performed by: NURSE PRACTITIONER

## 2025-01-06 PROCEDURE — G8420 CALC BMI NORM PARAMETERS: HCPCS | Performed by: NURSE PRACTITIONER

## 2025-01-06 PROCEDURE — G8427 DOCREV CUR MEDS BY ELIG CLIN: HCPCS | Performed by: NURSE PRACTITIONER

## 2025-01-06 PROCEDURE — 80053 COMPREHEN METABOLIC PANEL: CPT

## 2025-01-06 PROCEDURE — 99214 OFFICE O/P EST MOD 30 MIN: CPT | Performed by: NURSE PRACTITIONER

## 2025-01-06 PROCEDURE — M1308 PR FLU IMMUNIZE NO ADMIN: HCPCS | Performed by: NURSE PRACTITIONER

## 2025-01-06 PROCEDURE — 3074F SYST BP LT 130 MM HG: CPT | Performed by: NURSE PRACTITIONER

## 2025-01-06 PROCEDURE — 3079F DIAST BP 80-89 MM HG: CPT | Performed by: NURSE PRACTITIONER

## 2025-01-06 PROCEDURE — 71046 X-RAY EXAM CHEST 2 VIEWS: CPT

## 2025-01-06 PROCEDURE — 36415 COLL VENOUS BLD VENIPUNCTURE: CPT

## 2025-01-06 RX ORDER — CLOBAZAM 20 MG/1
20 TABLET ORAL 2 TIMES DAILY
COMMUNITY
Start: 2025-01-02

## 2025-01-06 RX ORDER — SUMATRIPTAN SUCCINATE 25 MG/1
25 TABLET ORAL
COMMUNITY
Start: 2024-11-14

## 2025-01-06 ASSESSMENT — PATIENT HEALTH QUESTIONNAIRE - PHQ9
10. IF YOU CHECKED OFF ANY PROBLEMS, HOW DIFFICULT HAVE THESE PROBLEMS MADE IT FOR YOU TO DO YOUR WORK, TAKE CARE OF THINGS AT HOME, OR GET ALONG WITH OTHER PEOPLE: NOT DIFFICULT AT ALL
SUM OF ALL RESPONSES TO PHQ QUESTIONS 1-9: 2
8. MOVING OR SPEAKING SO SLOWLY THAT OTHER PEOPLE COULD HAVE NOTICED. OR THE OPPOSITE, BEING SO FIGETY OR RESTLESS THAT YOU HAVE BEEN MOVING AROUND A LOT MORE THAN USUAL: NOT AT ALL
2. FEELING DOWN, DEPRESSED OR HOPELESS: SEVERAL DAYS
9. THOUGHTS THAT YOU WOULD BE BETTER OFF DEAD, OR OF HURTING YOURSELF: NOT AT ALL
SUM OF ALL RESPONSES TO PHQ9 QUESTIONS 1 & 2: 2
6. FEELING BAD ABOUT YOURSELF - OR THAT YOU ARE A FAILURE OR HAVE LET YOURSELF OR YOUR FAMILY DOWN: NOT AT ALL
4. FEELING TIRED OR HAVING LITTLE ENERGY: NOT AT ALL
3. TROUBLE FALLING OR STAYING ASLEEP: NOT AT ALL
SUM OF ALL RESPONSES TO PHQ QUESTIONS 1-9: 2
7. TROUBLE CONCENTRATING ON THINGS, SUCH AS READING THE NEWSPAPER OR WATCHING TELEVISION: NOT AT ALL
5. POOR APPETITE OR OVEREATING: NOT AT ALL
SUM OF ALL RESPONSES TO PHQ QUESTIONS 1-9: 2
1. LITTLE INTEREST OR PLEASURE IN DOING THINGS: SEVERAL DAYS
SUM OF ALL RESPONSES TO PHQ QUESTIONS 1-9: 2

## 2025-01-06 NOTE — PROGRESS NOTES
[address] [facility] [address] [facility] MAIN FLOOR  [facility] [address]   1/6/2025    Justino Dick is a 49 y.o. male who presents today for his medical conditions and/or complaints as noted below.    Justino Dick is scheduled today for No chief complaint on file.  .      HPI:     History of Present Illness  The patient is a 49-year-old male here today for a routine follow-up for depression, ADHD, seizures, and cardiomyopathy. He is managed by specialists for both his seizure activity and cardiomyopathy. He also sees behavioral health for management of his ADHD. His last office visit with his PCP was on 07/03/2024.    He reports experiencing flu-like symptoms since the beginning of 12/2024, characterized by fatigue, body aches, and severe nasal congestion. He was bedridden for 3 weeks. He experienced difficulty breathing when lying flat, which improved upon standing. He did not seek medical attention during this period but self-medicated with over-the-counter cold and congestion medications. He continues to experience significant fatigue and chest congestion, along with facial pressure and nasal drainage. He has not had any fever but reports chills and sweating in his hands and feet. He occasionally experiences a sore throat and has been consuming large amounts of water. He has not taken any antibiotics since his surgeries in 09/2024 and 10/2024. He has an upcoming appointment at the [facility] on Thursday.    He underwent a craniectomy and deep brain stimulator insertion on 09/19/2024, followed by a procedure to place the battery in the chest on 10/15/2024. The device was activated on 11/14/2024. He has been experiencing tingling in his legs when lying down, a symptom that has persisted for some time. He has tested negative for COVID-19 twice at home. He produces mucus when he coughs, which was initially neon green to dark green but has since lightened. He occasionally experiences a deep cough that

## 2025-07-07 ENCOUNTER — HOSPITAL ENCOUNTER (OUTPATIENT)
Age: 50
Discharge: HOME | End: 2025-07-07
Payer: MEDICARE

## 2025-07-07 ENCOUNTER — OFFICE VISIT (OUTPATIENT)
Dept: PRIMARY CARE CLINIC | Age: 50
End: 2025-07-07
Payer: MEDICARE

## 2025-07-07 VITALS
TEMPERATURE: 97 F | HEART RATE: 114 BPM | SYSTOLIC BLOOD PRESSURE: 115 MMHG | BODY MASS INDEX: 22.76 KG/M2 | WEIGHT: 192.8 LBS | HEIGHT: 77 IN | OXYGEN SATURATION: 100 % | DIASTOLIC BLOOD PRESSURE: 96 MMHG

## 2025-07-07 DIAGNOSIS — R53.1 GENERALIZED WEAKNESS: ICD-10-CM

## 2025-07-07 DIAGNOSIS — R00.0 TACHYCARDIA WITH HEART RATE 100-120 BEATS PER MINUTE: ICD-10-CM

## 2025-07-07 DIAGNOSIS — R21 RASH AND NONSPECIFIC SKIN ERUPTION: ICD-10-CM

## 2025-07-07 DIAGNOSIS — Z00.00 INITIAL MEDICARE ANNUAL WELLNESS VISIT: Primary | ICD-10-CM

## 2025-07-07 DIAGNOSIS — R73.9 HYPERGLYCEMIA: ICD-10-CM

## 2025-07-07 DIAGNOSIS — M25.521 ARTHRALGIA OF RIGHT ELBOW: ICD-10-CM

## 2025-07-07 LAB
BASOPHILS # BLD: 0.07 K/UL (ref 0–0.2)
BASOPHILS NFR BLD: 1 % (ref 0–2)
EOSINOPHIL # BLD: 0.22 K/UL (ref 0–0.44)
EOSINOPHILS RELATIVE PERCENT: 3 % (ref 1–4)
ERYTHROCYTE [DISTWIDTH] IN BLOOD BY AUTOMATED COUNT: 12.5 % (ref 11.8–14.4)
EST. AVERAGE GLUCOSE BLD GHB EST-MCNC: 97 MG/DL
FOLATE SERPL-MCNC: 19.1 NG/ML (ref 4.8–24.2)
HBA1C MFR BLD: 5 % (ref 4–6)
HCT VFR BLD AUTO: 47.5 % (ref 40.7–50.3)
HGB BLD-MCNC: 15.9 G/DL (ref 13–17)
IMM GRANULOCYTES # BLD AUTO: <0.03 K/UL (ref 0–0.3)
IMM GRANULOCYTES NFR BLD: 0 %
LYMPHOCYTES NFR BLD: 2.04 K/UL (ref 1.1–3.7)
LYMPHOCYTES RELATIVE PERCENT: 31 % (ref 24–43)
MCH RBC QN AUTO: 29.7 PG (ref 25.2–33.5)
MCHC RBC AUTO-ENTMCNC: 33.5 G/DL (ref 28.4–34.8)
MCV RBC AUTO: 88.6 FL (ref 82.6–102.9)
MONOCYTES NFR BLD: 0.63 K/UL (ref 0.1–1.2)
MONOCYTES NFR BLD: 10 % (ref 3–12)
NEUTROPHILS NFR BLD: 55 % (ref 36–65)
NEUTS SEG NFR BLD: 3.59 K/UL (ref 1.5–8.1)
NRBC BLD-RTO: 0 PER 100 WBC
PLATELET # BLD AUTO: 288 K/UL (ref 138–453)
PMV BLD AUTO: 10 FL (ref 8.1–13.5)
RBC # BLD AUTO: 5.36 M/UL (ref 4.21–5.77)
TSH SERPL DL<=0.05 MIU/L-ACNC: 1.26 UIU/ML (ref 0.27–4.2)
VIT B12 SERPL-MCNC: 1157 PG/ML (ref 232–1245)
WBC OTHER # BLD: 6.6 K/UL (ref 3.5–11.3)

## 2025-07-07 PROCEDURE — 3074F SYST BP LT 130 MM HG: CPT | Performed by: NURSE PRACTITIONER

## 2025-07-07 PROCEDURE — 85025 COMPLETE CBC W/AUTO DIFF WBC: CPT

## 2025-07-07 PROCEDURE — 1036F TOBACCO NON-USER: CPT | Performed by: NURSE PRACTITIONER

## 2025-07-07 PROCEDURE — 82746 ASSAY OF FOLIC ACID SERUM: CPT

## 2025-07-07 PROCEDURE — 84443 ASSAY THYROID STIM HORMONE: CPT

## 2025-07-07 PROCEDURE — 36415 COLL VENOUS BLD VENIPUNCTURE: CPT

## 2025-07-07 PROCEDURE — 82607 VITAMIN B-12: CPT

## 2025-07-07 PROCEDURE — G8427 DOCREV CUR MEDS BY ELIG CLIN: HCPCS | Performed by: NURSE PRACTITIONER

## 2025-07-07 PROCEDURE — G8420 CALC BMI NORM PARAMETERS: HCPCS | Performed by: NURSE PRACTITIONER

## 2025-07-07 PROCEDURE — 86618 LYME DISEASE ANTIBODY: CPT

## 2025-07-07 PROCEDURE — 3080F DIAST BP >= 90 MM HG: CPT | Performed by: NURSE PRACTITIONER

## 2025-07-07 PROCEDURE — 83036 HEMOGLOBIN GLYCOSYLATED A1C: CPT

## 2025-07-07 PROCEDURE — G0438 PPPS, INITIAL VISIT: HCPCS | Performed by: NURSE PRACTITIONER

## 2025-07-07 PROCEDURE — 99214 OFFICE O/P EST MOD 30 MIN: CPT | Performed by: NURSE PRACTITIONER

## 2025-07-07 RX ORDER — DOXYCYCLINE HYCLATE 100 MG
100 TABLET ORAL 2 TIMES DAILY
Qty: 20 TABLET | Refills: 0 | Status: SHIPPED | OUTPATIENT
Start: 2025-07-07 | End: 2025-07-17

## 2025-07-07 ASSESSMENT — PATIENT HEALTH QUESTIONNAIRE - PHQ9
2. FEELING DOWN, DEPRESSED OR HOPELESS: NOT AT ALL
7. TROUBLE CONCENTRATING ON THINGS, SUCH AS READING THE NEWSPAPER OR WATCHING TELEVISION: NOT AT ALL
4. FEELING TIRED OR HAVING LITTLE ENERGY: NOT AT ALL
8. MOVING OR SPEAKING SO SLOWLY THAT OTHER PEOPLE COULD HAVE NOTICED. OR THE OPPOSITE, BEING SO FIGETY OR RESTLESS THAT YOU HAVE BEEN MOVING AROUND A LOT MORE THAN USUAL: NOT AT ALL
5. POOR APPETITE OR OVEREATING: NOT AT ALL
3. TROUBLE FALLING OR STAYING ASLEEP: NOT AT ALL
SUM OF ALL RESPONSES TO PHQ QUESTIONS 1-9: 0
SUM OF ALL RESPONSES TO PHQ QUESTIONS 1-9: 0
1. LITTLE INTEREST OR PLEASURE IN DOING THINGS: NOT AT ALL
9. THOUGHTS THAT YOU WOULD BE BETTER OFF DEAD, OR OF HURTING YOURSELF: NOT AT ALL
6. FEELING BAD ABOUT YOURSELF - OR THAT YOU ARE A FAILURE OR HAVE LET YOURSELF OR YOUR FAMILY DOWN: NOT AT ALL
10. IF YOU CHECKED OFF ANY PROBLEMS, HOW DIFFICULT HAVE THESE PROBLEMS MADE IT FOR YOU TO DO YOUR WORK, TAKE CARE OF THINGS AT HOME, OR GET ALONG WITH OTHER PEOPLE: NOT DIFFICULT AT ALL
SUM OF ALL RESPONSES TO PHQ QUESTIONS 1-9: 0
SUM OF ALL RESPONSES TO PHQ QUESTIONS 1-9: 0

## 2025-07-07 ASSESSMENT — LIFESTYLE VARIABLES
HOW MANY STANDARD DRINKS CONTAINING ALCOHOL DO YOU HAVE ON A TYPICAL DAY: PATIENT DOES NOT DRINK
HOW OFTEN DO YOU HAVE A DRINK CONTAINING ALCOHOL: NEVER

## 2025-07-07 NOTE — PROGRESS NOTES
Medicare Annual Wellness Visit    Justino Dick is here for Medicare AWV    Assessment & Plan  1. Tachycardia.  - Elevated heart rate for the past few weeks, accompanied by dizziness and lightheadedness.  - Heart rate was mildly elevated in January 2025 at 103 bpm.  - Currently on carvedilol 3.125 mg twice daily.  - Advised to contact cardiologist; may increase carvedilol dosage to 6.25 mg twice daily until cardiology appointment. Blood work ordered to check blood counts, B12, thyroid function, and A1c levels.    2. Rash.  - Rash on chest and right hip, appears to be fungal in nature, possibly ringworm.  - Physical exam shows breaking out on chest and hip, likely fungal.  - Discussion about possible tick bites and differential diagnosis between ringworm and tick bites.  - Prescribed topical antifungal cream and doxycycline as a precautionary measure.    3. Seizure disorder.  - Recent bout of seizures about a week ago, experiencing 8 or 9 seizures over a couple of days.  - History of deep brain stimulator placed on 09/19/2024, activated in 11/2024.  - Follow-up with outpatient neurology at [facility] for seizure management.  - No changes in seizure management discussed during this visit.    4. Hypertension.  - Mildly tachycardic today with heart rate of 114 bpm.  - Currently on carvedilol 3.125 mg twice daily.  - Advised to contact cardiologist; may increase carvedilol dosage to 6.25 mg twice daily until cardiology appointment.  - Blood work ordered to check blood counts, B12, thyroid function, and A1c levels.    Follow-up  The patient will follow up in 6 months.  Initial Medicare annual wellness visit  Arthralgia of right elbow  -     Lyme Disease Reflex Panel (Tier 1); Future  -     Vitamin B12 & Folate; Future  Generalized weakness  -     terbinafine (LAMISIL) 1 % cream; Apply topically 2 times daily for 2 weeks, Disp-15 g, R-0, Normal  -     CBC with Auto Differential; Future  Rash and nonspecific skin

## 2025-07-09 LAB — B BURGDOR AB SER IA-ACNC: 0.25 IV

## 2025-07-10 ENCOUNTER — RESULTS FOLLOW-UP (OUTPATIENT)
Dept: PRIMARY CARE CLINIC | Age: 50
End: 2025-07-10

## 2025-07-17 ENCOUNTER — HOSPITAL ENCOUNTER (OUTPATIENT)
Age: 50
Discharge: HOME | End: 2025-07-17
Payer: MEDICARE

## 2025-07-17 DIAGNOSIS — R63.0 APPETITE LOSS: ICD-10-CM

## 2025-07-17 DIAGNOSIS — R10.84 GENERALIZED ABDOMINAL PAIN: ICD-10-CM

## 2025-07-17 PROCEDURE — 83013 H PYLORI (C-13) BREATH: CPT

## 2025-07-18 LAB — H PYLORI BREATH TEST: NEGATIVE

## 2025-07-21 ENCOUNTER — RESULTS FOLLOW-UP (OUTPATIENT)
Dept: PRIMARY CARE CLINIC | Age: 50
End: 2025-07-21

## (undated) DEVICE — GARMENT,MEDLINE,DVT,INT,CALF,MED, GEN2: Brand: MEDLINE

## (undated) DEVICE — LIGASURE BLUNT TIP 37CM FT10 COMPATIBLE: Brand: MEDLINE

## (undated) DEVICE — BLADE CLIPPER GEN PURP NS

## (undated) DEVICE — PACK LAP BASIC

## (undated) DEVICE — SOLUTION IV 1000ML 0.9% SOD CHL PH 5 INJ USP VIAFLX PLAS

## (undated) DEVICE — STAPLER INT L340MM 45MM STD 12 FIRING B FRM PWR + GRIPPING

## (undated) DEVICE — STRAP,POSITIONING,KNEE/BODY,FOAM,4X60": Brand: MEDLINE

## (undated) DEVICE — GOWN,SIRUS,NONRNF,SETINSLV,2XL,18/CS: Brand: MEDLINE

## (undated) DEVICE — TROCARS: Brand: KII® BALLOON BLUNT TIP SYSTEM

## (undated) DEVICE — SUTURE MONOCRYL SZ 4-0 L18IN ABSRB UD L16MM PC-3 3/8 CIR PRIM Y845G

## (undated) DEVICE — TISSUE RETRIEVAL SYSTEM: Brand: INZII RETRIEVAL SYSTEM

## (undated) DEVICE — GLOVE SURG SZ 8 L11.77IN FNGR THK9.8MIL STRW LTX POLYMER

## (undated) DEVICE — MITT SURG PREP L ADH DISPOSABLE

## (undated) DEVICE — 3M™ IOBAN™ 2 ANTIMICROBIAL INCISE DRAPE 6650EZ: Brand: IOBAN™ 2

## (undated) DEVICE — SOLUTION ANTIFOG VIS SYS CLEARIFY LAPSCP

## (undated) DEVICE — TROCAR: Brand: KII SHIELDED BLADED ACCESS SYSTEM

## (undated) DEVICE — TROCAR: Brand: KII® SLEEVE

## (undated) DEVICE — Z DISCONTINUED USE 2220241 SUTURE SZ 0 27IN 5/8 CIR UR-6  TAPER PT VIOLET ABSRB VICRYL J603H

## (undated) DEVICE — LIQUIBAND RAPID ADHESIVE 36/CS 0.8ML: Brand: MEDLINE